# Patient Record
Sex: FEMALE | Race: WHITE | NOT HISPANIC OR LATINO | Employment: FULL TIME | RURAL
[De-identification: names, ages, dates, MRNs, and addresses within clinical notes are randomized per-mention and may not be internally consistent; named-entity substitution may affect disease eponyms.]

---

## 2022-09-20 ENCOUNTER — OUTSIDE PLACE OF SERVICE (OUTPATIENT)
Dept: CARDIOLOGY | Facility: HOSPITAL | Age: 62
End: 2022-09-20
Payer: COMMERCIAL

## 2022-09-20 PROCEDURE — 93010 ELECTROCARDIOGRAM REPORT: CPT | Mod: ,,, | Performed by: INTERNAL MEDICINE

## 2022-09-20 PROCEDURE — 93010 PR ELECTROCARDIOGRAM REPORT: ICD-10-PCS | Mod: ,,, | Performed by: INTERNAL MEDICINE

## 2023-03-01 ENCOUNTER — HOSPITAL ENCOUNTER (EMERGENCY)
Facility: HOSPITAL | Age: 63
Discharge: HOME | End: 2023-03-01
Payer: COMMERCIAL

## 2023-03-01 VITALS
TEMPERATURE: 98 F | HEIGHT: 64 IN | OXYGEN SATURATION: 96 % | RESPIRATION RATE: 20 BRPM | HEART RATE: 90 BPM | BODY MASS INDEX: 39.27 KG/M2 | SYSTOLIC BLOOD PRESSURE: 140 MMHG | DIASTOLIC BLOOD PRESSURE: 70 MMHG | WEIGHT: 230 LBS

## 2023-03-01 DIAGNOSIS — T14.90XA TRAUMA: ICD-10-CM

## 2023-03-01 DIAGNOSIS — S62.327A DISPLACED FRACTURE OF SHAFT OF FIFTH METACARPAL BONE, LEFT HAND, INITIAL ENCOUNTER FOR CLOSED FRACTURE: Primary | ICD-10-CM
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
## 2023-03-01 PROCEDURE — 99284 EMERGENCY DEPT VISIT MOD MDM: CPT | Mod: ,,, | Performed by: FAMILY MEDICINE

## 2023-03-01 PROCEDURE — 99284 PR EMERGENCY DEPT VISIT,LEVEL IV: ICD-10-PCS | Mod: ,,, | Performed by: FAMILY MEDICINE

## 2023-03-01 PROCEDURE — 99284 EMERGENCY DEPT VISIT MOD MDM: CPT | Mod: 25

## 2023-03-01 RX ORDER — ATORVASTATIN CALCIUM 40 MG/1
TABLET, FILM COATED ORAL
COMMUNITY

## 2023-03-01 RX ORDER — ASCORBIC ACID 500 MG
500 TABLET,CHEWABLE ORAL
COMMUNITY

## 2023-03-01 RX ORDER — ASPIRIN 81 MG/1
TABLET ORAL
COMMUNITY

## 2023-03-01 RX ORDER — INSULIN DETEMIR 100 [IU]/ML
INJECTION, SOLUTION SUBCUTANEOUS
COMMUNITY
Start: 2023-01-16

## 2023-03-01 RX ORDER — URSODIOL 300 MG/1
CAPSULE ORAL
COMMUNITY

## 2023-03-01 RX ORDER — NAPROXEN 500 MG/1
500 TABLET ORAL 2 TIMES DAILY WITH MEALS
Qty: 60 TABLET | Refills: 0 | Status: ON HOLD | OUTPATIENT
Start: 2023-03-01 | End: 2023-10-18 | Stop reason: CLARIF

## 2023-03-01 RX ORDER — METFORMIN HYDROCHLORIDE 500 MG/1
TABLET ORAL
COMMUNITY

## 2023-03-01 RX ORDER — PANTOPRAZOLE SODIUM 40 MG/1
TABLET, DELAYED RELEASE ORAL
COMMUNITY

## 2023-03-01 RX ORDER — GLIMEPIRIDE 4 MG/1
4 TABLET ORAL 2 TIMES DAILY
COMMUNITY
Start: 2022-12-07

## 2023-03-01 RX ORDER — HYDROCHLOROTHIAZIDE 25 MG/1
12.5 TABLET ORAL
COMMUNITY

## 2023-03-01 RX ORDER — LISINOPRIL AND HYDROCHLOROTHIAZIDE 20; 25 MG/1; MG/1
TABLET ORAL
COMMUNITY

## 2023-03-01 NOTE — Clinical Note
"Tatiana Youngbloode" Nora was seen and treated in our emergency department on 3/1/2023.  She may return to work on 03/08/2023.       If you have any questions or concerns, please don't hesitate to call.      Jethro Colón, DO"

## 2023-03-01 NOTE — ED PROVIDER NOTES
Encounter Date: 3/1/2023       History     Chief Complaint   Patient presents with    Fall     Patient comes in with fall trauma hit face and head stung for 2nd the patient takes aspirin.  She has what appears to be loss teeth  --in her front maxillofacial region.  She also has a deformity of her hand which looks more like a hematoma to the dorsum of the hand.  Decreased range of motion of her fingers on the left.  With edema to the left 5th finger.      Review of patient's allergies indicates:  No Known Allergies  No past medical history on file.  No past surgical history on file.  No family history on file.     Review of Systems   Constitutional: Negative.  Negative for fever.   HENT: Negative.  Negative for sore throat.    Eyes: Negative.    Respiratory: Negative.  Negative for shortness of breath.    Cardiovascular: Negative.  Negative for chest pain.   Gastrointestinal: Negative.  Negative for nausea.   Endocrine: Negative.    Genitourinary: Negative.  Negative for dysuria.   Musculoskeletal: Negative.  Negative for back pain.   Skin: Negative.  Negative for rash.   Allergic/Immunologic: Negative.    Neurological: Negative.  Negative for weakness.        Fall with hand pain on the left with hematoma decreased range of motion   Hematological: Negative.  Does not bruise/bleed easily.   Psychiatric/Behavioral: Negative.     All other systems reviewed and are negative.    Physical Exam     Initial Vitals [03/01/23 1219]   BP Pulse Resp Temp SpO2   (!) 140/70 90 20 98.2 °F (36.8 °C) 96 %      MAP       --         Physical Exam    Constitutional: She appears well-developed and well-nourished.   HENT:   Head: Normocephalic and atraumatic.   Right Ear: External ear normal.   Left Ear: External ear normal.   Nose: Nose normal.   Mouth/Throat: Oropharynx is clear and moist.   Eyes: Conjunctivae and EOM are normal. Pupils are equal, round, and reactive to light.   Neck: Neck supple.   Normal range of  motion.  Cardiovascular:  Normal rate, regular rhythm, normal heart sounds and intact distal pulses.           Pulmonary/Chest: Breath sounds normal.   Abdominal: Abdomen is soft. Bowel sounds are normal.   Genitourinary:    Vagina and uterus normal.     Musculoskeletal:         General: Normal range of motion.      Cervical back: Normal range of motion and neck supple.      Comments: Patient with left hand pain.  With edema and what looks to be a hematoma to the top of the left hand.  Decreased range of motion of her left 5th finger.     Neurological: She is alert and oriented to person, place, and time. She has normal strength and normal reflexes.   Skin: Skin is warm. Capillary refill takes less than 2 seconds.   Psychiatric: She has a normal mood and affect. Her behavior is normal. Judgment and thought content normal.       Medical Screening Exam   See Full Note    ED Course   Procedures  Labs Reviewed - No data to display       Imaging Results              X-Ray Hand 3 view Left (Final result)  Result time 03/01/23 13:04:29      Final result by Gabriel Osborn DO (03/01/23 13:04:29)                   Impression:      Acute nondisplaced fracture at the neck of the 5th metacarpal.    Additional acute comminuted fracture at the proximal aspect of the proximal phalanx of the 5th ray.      Electronically signed by: Gabriel Osborn  Date:    03/01/2023  Time:    13:04               Narrative:    EXAMINATION:  XR HAND COMPLETE 3 VIEW LEFT    CLINICAL HISTORY:  Patient with fall trauma to the left hand with edema swelling rule out fracture;    TECHNIQUE:  XR HAND COMPLETE 3 VIEW LEFT    COMPARISON:  None    FINDINGS:  Acute nondisplaced fracture at the neck of the 5th metacarpal.    Additional acute comminuted fracture at the proximal aspect of the proximal phalanx of the 5th ray.    No joint abnormality.    No radiopaque foreign bodies.                                       X-Ray Ribs 2 View Left (Final result)   Result time 03/01/23 13:05:20      Final result by Gabriel Osborn DO (03/01/23 13:05:20)                   Impression:      No acute findings      Electronically signed by: Gabriel Osborn  Date:    03/01/2023  Time:    13:05               Narrative:    EXAMINATION:  XR RIBS 2 VIEW LEFT    CLINICAL HISTORY:  Injury, unspecified, initial encounter    TECHNIQUE:  XR RIBS 2 VIEW LEFT    COMPARISON:  None    FINDINGS:  No acute fracture or dislocation.    The visualized left lung is clear.  The pleura is normal.  Heart is enlarged.                                       CT Maxillofacial Without Contrast (Final result)  Result time 03/01/23 13:03:29      Final result by Gabriel Osborn DO (03/01/23 13:03:29)                   Impression:      No acute findings    Place of service: [facility]      Electronically signed by: Gabriel Osborn  Date:    03/01/2023  Time:    13:03               Narrative:    EXAMINATION:  CT MAXILLOFACIAL WITHOUT CONTRAST    CLINICAL HISTORY:  Maxillofacial pain;    TECHNIQUE:  Multiplanar CT of the facial bones without contrast.    COMPARISON:  3/1/23    FINDINGS:  The bony orbits, zygomatic arches, pterygoid plates, maxilla, nasal bones and mandible are intact.    No significant soft tissue swelling is suggested.    No air-fluid levels noted within the paranasal sinuses.                                       CT Head Without Contrast (Final result)  Result time 03/01/23 13:01:51      Final result by Gabriel Osborn DO (03/01/23 13:01:51)                   Impression:      No acute intracranial findings.      Electronically signed by: Gabriel Osborn  Date:    03/01/2023  Time:    13:01               Narrative:    EXAMINATION:  CT HEAD WITHOUT CONTRAST    CLINICAL HISTORY:  Headache, chronic, new features or increased frequency;    TECHNIQUE:  Multiplanar CT imaging from the vertex to skull the skull base was performed without  contrast.    COMPARISON:  None    FINDINGS:  Gray-white white differentiation is normal.    There is no CT evidence of acute intracranial hemorrhage or large territorial infarct. No epidural/subdural hematomas.    There is no evidence of hydrocephalus, midline shift or mass effect.    Scalp, paranasal sinuses, and mastoid air cells are normal.                                       Medications - No data to display  Medical Decision Making:   Initial Assessment:   Patient comes in with pain to the left hand as mentioned before has a hematoma over the left hand possible fracture of the left hand.  Decreased amount of range of motion to the left 5th finger.  And she also has loose front T and at tripped and hit her head and facial bones.  Differential Diagnosis:   Hematoma to the left hand over the dorsal area of the metacarpal bones.                 Clinical Impression:   Final diagnoses:  [W19.XXXA] Fall, initial encounter  [S62.327A] Displaced fracture of shaft of fifth metacarpal bone, left hand, initial encounter for closed fracture (Primary)               Jethro Colón,   03/01/23 2085

## 2023-03-01 NOTE — Clinical Note
"Tatiana Youngbloode" Nora was seen and treated in our emergency department on 3/1/2023.  She may return to work on 03/13/2023.       If you have any questions or concerns, please don't hesitate to call.      Jethro Colón, DO"

## 2023-03-03 ENCOUNTER — HOSPITAL ENCOUNTER (OUTPATIENT)
Dept: RADIOLOGY | Facility: HOSPITAL | Age: 63
Discharge: HOME | End: 2023-03-03
Attending: ORTHOPAEDIC SURGERY
Payer: COMMERCIAL

## 2023-03-03 ENCOUNTER — OFFICE VISIT (OUTPATIENT)
Dept: ORTHOPEDICS | Facility: CLINIC | Age: 63
End: 2023-03-03
Payer: COMMERCIAL

## 2023-03-03 DIAGNOSIS — M79.642 PAIN OF LEFT HAND: Primary | ICD-10-CM

## 2023-03-03 DIAGNOSIS — M79.642 PAIN OF LEFT HAND: ICD-10-CM

## 2023-03-03 PROCEDURE — 26600 PR CLOSED RX METACARPAL FX: ICD-10-PCS | Mod: S$PBB,LT,, | Performed by: ORTHOPAEDIC SURGERY

## 2023-03-03 PROCEDURE — 26720 TREAT FINGER FRACTURE EACH: CPT | Mod: S$PBB,51,LT, | Performed by: ORTHOPAEDIC SURGERY

## 2023-03-03 PROCEDURE — 26720 TREAT FINGER FRACTURE EACH: CPT | Mod: PBBFAC | Performed by: ORTHOPAEDIC SURGERY

## 2023-03-03 PROCEDURE — 73130 X-RAY EXAM OF HAND: CPT | Mod: 26,LT,, | Performed by: ORTHOPAEDIC SURGERY

## 2023-03-03 PROCEDURE — 26600 TREAT METACARPAL FRACTURE: CPT | Mod: PBBFAC,LT | Performed by: ORTHOPAEDIC SURGERY

## 2023-03-03 PROCEDURE — 73130 X-RAY EXAM OF HAND: CPT | Mod: TC,LT

## 2023-03-03 PROCEDURE — 99213 OFFICE O/P EST LOW 20 MIN: CPT | Mod: PBBFAC,25 | Performed by: ORTHOPAEDIC SURGERY

## 2023-03-03 PROCEDURE — 73130 XR HAND COMPLETE 3 VIEW LEFT: ICD-10-PCS | Mod: 26,LT,, | Performed by: ORTHOPAEDIC SURGERY

## 2023-03-03 PROCEDURE — 1159F PR MEDICATION LIST DOCUMENTED IN MEDICAL RECORD: ICD-10-PCS | Mod: ,,, | Performed by: ORTHOPAEDIC SURGERY

## 2023-03-03 PROCEDURE — 1159F MED LIST DOCD IN RCRD: CPT | Mod: ,,, | Performed by: ORTHOPAEDIC SURGERY

## 2023-03-03 PROCEDURE — 99202 OFFICE O/P NEW SF 15 MIN: CPT | Mod: S$PBB,57,, | Performed by: ORTHOPAEDIC SURGERY

## 2023-03-03 PROCEDURE — 26720 PR CLOSE RX PROX/MID FING SHFT FX: ICD-10-PCS | Mod: S$PBB,51,LT, | Performed by: ORTHOPAEDIC SURGERY

## 2023-03-03 PROCEDURE — 26600 TREAT METACARPAL FRACTURE: CPT | Mod: S$PBB,LT,, | Performed by: ORTHOPAEDIC SURGERY

## 2023-03-03 PROCEDURE — 99202 PR OFFICE/OUTPT VISIT, NEW, LEVL II, 15-29 MIN: ICD-10-PCS | Mod: S$PBB,57,, | Performed by: ORTHOPAEDIC SURGERY

## 2023-03-03 NOTE — PROGRESS NOTES
CC:   Chief Complaint   Patient presents with    Left Hand - Injury        PREVIOUS INFO:        HISTORY:   3/3/2023    Tatiana Melendez  is a 62 y.o. fell on concrete injured her left hand on Wednesday had x-rays performed which show fractures referred in      PAST MEDICAL HISTORY: History reviewed. No pertinent past medical history.       PAST SURGICAL HISTORY:   Past Surgical History:   Procedure Laterality Date    ANKLE FRACTURE SURGERY      KNEE ARTHROSCOPY            ALLERGIES: Review of patient's allergies indicates:  No Known Allergies     MEDICATIONS :    Current Outpatient Medications:     ascorbic acid, vitamin C, 500 mg Chew, Take 500 mg by mouth., Disp: , Rfl:     aspirin (ASPIR-LOW) 81 MG EC tablet, Aspir-Low 81 mg tablet,delayed release  Take 1 tablet every day by oral route., Disp: , Rfl:     atorvastatin (LIPITOR) 40 MG tablet, atorvastatin 40 mg tablet  TAKE 1/2 (ONE-HALF) TABLET BY MOUTH ONCE DAILY AT BEDTIME, Disp: , Rfl:     empagliflozin (JARDIANCE) 25 mg tablet, Jardiance 25 mg tablet, Disp: , Rfl:     glimepiride (AMARYL) 4 MG tablet, Take 4 mg by mouth 2 (two) times daily., Disp: , Rfl:     hydroCHLOROthiazide (HYDRODIURIL) 25 MG tablet, Take 12.5 mg by mouth., Disp: , Rfl:     LEVEMIR FLEXTOUCH U-100 INSULN 100 unit/mL (3 mL) InPn pen, SMARTSI Unit(s) SUB-Q Twice Daily, Disp: , Rfl:     lisinopriL-hydrochlorothiazide (PRINZIDE,ZESTORETIC) 20-25 mg Tab, lisinopril 20 mg-hydrochlorothiazide 25 mg tablet  TAKE 1/2 (ONE-HALF) TABLET BY MOUTH ONCE DAILY, Disp: , Rfl:     metFORMIN (GLUCOPHAGE) 500 MG tablet, metformin 500 mg tablet  TAKE 2 TABLETS BY MOUTH TWICE DAILY, Disp: , Rfl:     naproxen (NAPROSYN) 500 MG tablet, Take 1 tablet (500 mg total) by mouth 2 (two) times daily with meals., Disp: 60 tablet, Rfl: 0    pantoprazole (PROTONIX) 40 MG tablet, pantoprazole 40 mg tablet,delayed release  TAKE ONE TABLET BY MOUTH AT 7am AND 7pm, Disp: , Rfl:     ursodioL (ACTIGALL) 300 mg  capsule, ursodiol 300 mg capsule  TAKE 1 CAPSULE BY MOUTH TWICE DAILY, Disp: , Rfl:      SOCIAL HISTORY:   Social History     Socioeconomic History    Marital status: Single        ROS    FAMILY HISTORY: History reviewed. No pertinent family history.       PHYSICAL EXAM: There were no vitals filed for this visit.            There is no height or weight on file to calculate BMI.     In general, this is a well-developed, well-nourished female . The patient is alert, oriented and cooperative.      HEENT:  Normocephalic, atraumatic.  Extraocular movements are intact bilaterally.  The oropharynx is benign.       NECK:  Nontender with good range of motion.      PULMONARY: Respirations are even and non-labored.       CARDIOVASCULAR: Pulses regular by peripheral palpation.       ABDOMEN:  Soft, non-tender, non-distended.        EXTREMITIES:  Left hand is bruised fairly diffusely but it soft she is tender on the lateral side more    Ortho Exam      RADIOGRAPHIC FINDINGS:  X-rays reviewed from March 1st showing a 5th metacarpal neck fracture nondisplaced and a fracture involving the proximal phalanx little finger and overall good alignment    Right hand AP lateral oblique views splint ulnar gutter splint in place fractures involving the proximal phalanx of the little finger in good alignment AP lateral oblique views fracture of the 5th metacarpal seen on the previous films is poorly visualized with excellent alignment of the 5th metacarpal  .      IMPRESSION:  Right-handed patient with a left 5th metacarpal neck fracture   and a little finger proximal phalanx fracture    PLAN:  Ulnar gutter splint applied today follow-up x-rays obtained splint was made out of plaster        No follow-ups on file.         Richi Bello III      (Subject to voice recognition error, transcription service not allowed)

## 2023-03-27 DIAGNOSIS — M79.642 PAIN OF LEFT HAND: Primary | ICD-10-CM

## 2023-03-28 ENCOUNTER — HOSPITAL ENCOUNTER (OUTPATIENT)
Dept: RADIOLOGY | Facility: HOSPITAL | Age: 63
Discharge: HOME | End: 2023-03-28
Attending: ORTHOPAEDIC SURGERY
Payer: OTHER MISCELLANEOUS

## 2023-03-28 ENCOUNTER — OFFICE VISIT (OUTPATIENT)
Dept: ORTHOPEDICS | Facility: CLINIC | Age: 63
End: 2023-03-28
Payer: OTHER MISCELLANEOUS

## 2023-03-28 DIAGNOSIS — Z78.0 MENOPAUSE: ICD-10-CM

## 2023-03-28 DIAGNOSIS — Z09 FOLLOW-UP EXAMINATION, FOLLOWING OTHER SURGERY: Primary | ICD-10-CM

## 2023-03-28 DIAGNOSIS — M79.642 PAIN OF LEFT HAND: ICD-10-CM

## 2023-03-28 PROCEDURE — 99024 POSTOP FOLLOW-UP VISIT: CPT | Mod: ,,, | Performed by: ORTHOPAEDIC SURGERY

## 2023-03-28 PROCEDURE — 73130 XR HAND COMPLETE 3 VIEW LEFT: ICD-10-PCS | Mod: 26,LT,, | Performed by: ORTHOPAEDIC SURGERY

## 2023-03-28 PROCEDURE — 73130 X-RAY EXAM OF HAND: CPT | Mod: TC,LT

## 2023-03-28 PROCEDURE — 29125 APPL SHORT ARM SPLINT STATIC: CPT | Mod: PBBFAC | Performed by: ORTHOPAEDIC SURGERY

## 2023-03-28 PROCEDURE — 99024 PR POST-OP FOLLOW-UP VISIT: ICD-10-PCS | Mod: ,,, | Performed by: ORTHOPAEDIC SURGERY

## 2023-03-28 PROCEDURE — 73130 X-RAY EXAM OF HAND: CPT | Mod: 26,LT,, | Performed by: ORTHOPAEDIC SURGERY

## 2023-03-28 PROCEDURE — 29125 APPL SHORT ARM SPLINT STATIC: CPT | Mod: S$PBB,58,LT, | Performed by: ORTHOPAEDIC SURGERY

## 2023-03-28 PROCEDURE — 99212 OFFICE O/P EST SF 10 MIN: CPT | Mod: PBBFAC | Performed by: ORTHOPAEDIC SURGERY

## 2023-03-28 PROCEDURE — 29125 PR APPLY FOREARM SPLINT,STATIC: ICD-10-PCS | Mod: S$PBB,58,LT, | Performed by: ORTHOPAEDIC SURGERY

## 2023-03-28 NOTE — PROGRESS NOTES
CC:   Chief Complaint   Patient presents with    Follow-up     LT HAND 5TH MC NECK FX & LT LITTLE FINGER PROX PHALANX 3/1 (4 WEEKS)        PREVIOUS INFO:  HISTORY:   3/3/2023    Tatiana Melendez  is a 62 y.o. fell on concrete injured her left hand on Wednesday had x-rays performed which show fractures referred in      HISTORY:   3/28/2023    Tatiana Melendez  is a 62 y.o. ulnar gutter splint removed today following up a right 5th metacarpal neck fracture and a little finger proximal phalanx fracture.      PAST MEDICAL HISTORY: No past medical history on file.       PAST SURGICAL HISTORY:   Past Surgical History:   Procedure Laterality Date    ANKLE FRACTURE SURGERY      KNEE ARTHROSCOPY            ALLERGIES: Review of patient's allergies indicates:  No Known Allergies     MEDICATIONS :    Current Outpatient Medications:     ascorbic acid, vitamin C, 500 mg Chew, Take 500 mg by mouth., Disp: , Rfl:     aspirin (ASPIR-LOW) 81 MG EC tablet, Aspir-Low 81 mg tablet,delayed release  Take 1 tablet every day by oral route., Disp: , Rfl:     atorvastatin (LIPITOR) 40 MG tablet, atorvastatin 40 mg tablet  TAKE 1/2 (ONE-HALF) TABLET BY MOUTH ONCE DAILY AT BEDTIME, Disp: , Rfl:     empagliflozin (JARDIANCE) 25 mg tablet, Jardiance 25 mg tablet, Disp: , Rfl:     glimepiride (AMARYL) 4 MG tablet, Take 4 mg by mouth 2 (two) times daily., Disp: , Rfl:     hydroCHLOROthiazide (HYDRODIURIL) 25 MG tablet, Take 12.5 mg by mouth., Disp: , Rfl:     LEVEMIR FLEXTOUCH U-100 INSULN 100 unit/mL (3 mL) InPn pen, SMARTSI Unit(s) SUB-Q Twice Daily, Disp: , Rfl:     lisinopriL-hydrochlorothiazide (PRINZIDE,ZESTORETIC) 20-25 mg Tab, lisinopril 20 mg-hydrochlorothiazide 25 mg tablet  TAKE 1/2 (ONE-HALF) TABLET BY MOUTH ONCE DAILY, Disp: , Rfl:     metFORMIN (GLUCOPHAGE) 500 MG tablet, metformin 500 mg tablet  TAKE 2 TABLETS BY MOUTH TWICE DAILY, Disp: , Rfl:     naproxen (NAPROSYN) 500 MG tablet, Take 1 tablet (500 mg total) by mouth 2  (two) times daily with meals., Disp: 60 tablet, Rfl: 0    pantoprazole (PROTONIX) 40 MG tablet, pantoprazole 40 mg tablet,delayed release  TAKE ONE TABLET BY MOUTH AT 7am AND 7pm, Disp: , Rfl:     ursodioL (ACTIGALL) 300 mg capsule, ursodiol 300 mg capsule  TAKE 1 CAPSULE BY MOUTH TWICE DAILY, Disp: , Rfl:      SOCIAL HISTORY:   Social History     Socioeconomic History    Marital status: Single        ROS    FAMILY HISTORY: No family history on file.       PHYSICAL EXAM: There were no vitals filed for this visit.            There is no height or weight on file to calculate BMI.     In general, this is a well-developed, well-nourished female . The patient is alert, oriented and cooperative.      HEENT:  Normocephalic, atraumatic.  Extraocular movements are intact bilaterally.  The oropharynx is benign.       NECK:  Nontender with good range of motion.      PULMONARY: Respirations are even and non-labored.       CARDIOVASCULAR: Pulses regular by peripheral palpation.       ABDOMEN:  Soft, non-tender, non-distended.        EXTREMITIES:  On exam her hands since looks looks normal exam as far as alignment there is some bruising over the ring finger in addition over the proximal phalanx swelling is gone    Ortho Exam      RADIOGRAPHIC FINDINGS:  Left hand three views AP lateral oblique view splints in place pre visualized fractures of the 5th metacarpal neck and the proximal phalanx of the little finger in good alignment no definitive fracture seen involving the ring finger but there is significant osteoporosis diffusely      .      IMPRESSION:  I will hold her still 2 more weeks and ulnar gutter splint and then see her back and get x-rays    PLAN:  I the splint start motion at that point new ulnar gutter plaster splint left upper extremity        No follow-ups on file.         Ricih Bello III      (Subject to voice recognition error, transcription service not allowed)

## 2023-04-13 DIAGNOSIS — M79.642 PAIN OF LEFT HAND: Primary | ICD-10-CM

## 2023-04-14 ENCOUNTER — OFFICE VISIT (OUTPATIENT)
Dept: ORTHOPEDICS | Facility: CLINIC | Age: 63
End: 2023-04-14
Payer: OTHER MISCELLANEOUS

## 2023-04-14 ENCOUNTER — HOSPITAL ENCOUNTER (OUTPATIENT)
Dept: RADIOLOGY | Facility: HOSPITAL | Age: 63
Discharge: HOME | End: 2023-04-14
Attending: ORTHOPAEDIC SURGERY
Payer: OTHER MISCELLANEOUS

## 2023-04-14 DIAGNOSIS — M79.642 PAIN OF LEFT HAND: ICD-10-CM

## 2023-04-14 DIAGNOSIS — Z09 FOLLOW-UP EXAMINATION, FOLLOWING OTHER SURGERY: Primary | ICD-10-CM

## 2023-04-14 PROCEDURE — 73130 X-RAY EXAM OF HAND: CPT | Mod: TC,LT

## 2023-04-14 PROCEDURE — 73130 X-RAY EXAM OF HAND: CPT | Mod: 26,LT,, | Performed by: ORTHOPAEDIC SURGERY

## 2023-04-14 PROCEDURE — 99024 POSTOP FOLLOW-UP VISIT: CPT | Mod: ,,, | Performed by: ORTHOPAEDIC SURGERY

## 2023-04-14 PROCEDURE — 73130 XR HAND COMPLETE 3 VIEW LEFT: ICD-10-PCS | Mod: 26,LT,, | Performed by: ORTHOPAEDIC SURGERY

## 2023-04-14 PROCEDURE — 99024 PR POST-OP FOLLOW-UP VISIT: ICD-10-PCS | Mod: ,,, | Performed by: ORTHOPAEDIC SURGERY

## 2023-04-14 PROCEDURE — 99212 OFFICE O/P EST SF 10 MIN: CPT | Mod: PBBFAC | Performed by: ORTHOPAEDIC SURGERY

## 2023-04-14 NOTE — PROGRESS NOTES
CC:    Chief Complaint   Patient presents with    Follow-up     LT HAND 5TH MC NECK FX & LT LITTLE FINGER PROX PHALANX 3/1 (6 WEEKS)           Previos History :  Follow-up        LT HAND 5TH MC NECK FX & LT LITTLE FINGER PROX PHALANX 3/1 (4 WEEKS)         PREVIOUS INFO:  HISTORY:   3/3/2023    Tatiana Melendez  is a 62 y.o. fell on concrete injured her left hand on Wednesday had x-rays performed which show fractures referred in        HISTORY:   3/28/2023    Tatiana Melendez  is a 62 y.o. ulnar gutter splint removed today following up  left  5th metacarpal neck fracture and a little finger proximal phalanx fracture.         History:  4/14/2023   Tatiana Melendez is a 62 y.o.  status post follow-up fractures 5th metacarpal neck and proximal phalanx little finger left hand date of injury 03/01/2023 proximally 6 weeks ago        PE:   Fingers are stiff as would be expected overall good alignment      Radiology:  Left hand AP lateral oblique views osteopenia diffusely progressively healing fractures involving the 5th metacarpal neck and proximal phalanx of the little finger progressively healing good        Ass/Plan:  Gonzales-tape work on motion no strenuous activities I did write a prescription for therapy will check on about 4 weeks x-rays left hand        Richi Bello III, MD    Subject to voice recognition errors,  transcription services are not allowed

## 2023-05-19 DIAGNOSIS — M79.642 PAIN OF LEFT HAND: Primary | ICD-10-CM

## 2023-05-30 ENCOUNTER — OFFICE VISIT (OUTPATIENT)
Dept: ORTHOPEDICS | Facility: CLINIC | Age: 63
End: 2023-05-30
Payer: COMMERCIAL

## 2023-05-30 ENCOUNTER — HOSPITAL ENCOUNTER (OUTPATIENT)
Dept: RADIOLOGY | Facility: HOSPITAL | Age: 63
Discharge: HOME | End: 2023-05-30
Attending: ORTHOPAEDIC SURGERY
Payer: COMMERCIAL

## 2023-05-30 DIAGNOSIS — M79.642 PAIN OF LEFT HAND: Primary | ICD-10-CM

## 2023-05-30 DIAGNOSIS — M79.642 PAIN OF LEFT HAND: ICD-10-CM

## 2023-05-30 DIAGNOSIS — Z09 FOLLOW-UP EXAMINATION, FOLLOWING OTHER SURGERY: Primary | ICD-10-CM

## 2023-05-30 PROCEDURE — 4010F PR ACE/ARB THEARPY RXD/TAKEN: ICD-10-PCS | Mod: ,,, | Performed by: ORTHOPAEDIC SURGERY

## 2023-05-30 PROCEDURE — 73130 XR HAND COMPLETE 3 VIEW LEFT: ICD-10-PCS | Mod: 26,LT,, | Performed by: ORTHOPAEDIC SURGERY

## 2023-05-30 PROCEDURE — 4010F ACE/ARB THERAPY RXD/TAKEN: CPT | Mod: ,,, | Performed by: ORTHOPAEDIC SURGERY

## 2023-05-30 PROCEDURE — 99024 POSTOP FOLLOW-UP VISIT: CPT | Mod: ,,, | Performed by: ORTHOPAEDIC SURGERY

## 2023-05-30 PROCEDURE — 99212 OFFICE O/P EST SF 10 MIN: CPT | Mod: PBBFAC | Performed by: ORTHOPAEDIC SURGERY

## 2023-05-30 PROCEDURE — 99024 PR POST-OP FOLLOW-UP VISIT: ICD-10-PCS | Mod: ,,, | Performed by: ORTHOPAEDIC SURGERY

## 2023-05-30 PROCEDURE — 73130 X-RAY EXAM OF HAND: CPT | Mod: 26,LT,, | Performed by: ORTHOPAEDIC SURGERY

## 2023-05-30 PROCEDURE — 73130 X-RAY EXAM OF HAND: CPT | Mod: TC,LT

## 2023-05-30 NOTE — PROGRESS NOTES
CC:    Chief Complaint   Patient presents with    Follow-up     LT HAND 5TH MC NECK FX & LT LITTLE FINGER PROXPHALANX 3/1 (3MTHS)           Previos History :        History:  5/30/2023   Tatiana Melendez is a 62 y.o.  status post 3 months out left hand fracture of the 5th metacarpal shaft and the proximal phalanx of the left little finger surgery was the fracture was on 03/01/2023 3 months        PE:   Comes in she is about 3 months out left hand little finger and 5th metacarpal neck fracture  MCP joint 0-50 degrees of motion  PIP joint 5-90 degrees      Radiology:  Left hand three views AP lateral oblique view progressively healing fracture involving the proximal phalanx of the little finger and metacarpal fracture varus significantly osteopenic bone        Ass/Plan:  Continue push the motion she never made to formal therapy she is 3 months out her continue to work on will do an impairment consider today date of maximum medical improvement    Impairment rating  Based on loss of motion MCP joint motion is 0-50 degrees which would be a 22% impairment of the finger  PIP joint motion is 5-90 which would be a 9% impairment to the finger  Total impairment of the little finger is 31%  3% to the hand  2% to the upper extremity  1% to the person        Richi Bello III, MD    Subject to voice recognition errors,  transcription services are not allowed

## 2023-06-12 ENCOUNTER — HOSPITAL ENCOUNTER (OUTPATIENT)
Dept: RADIOLOGY | Facility: HOSPITAL | Age: 63
Discharge: HOME | End: 2023-06-12
Attending: ORTHOPAEDIC SURGERY
Payer: COMMERCIAL

## 2023-06-12 DIAGNOSIS — Z78.0 MENOPAUSE: ICD-10-CM

## 2023-06-12 PROCEDURE — 77080 DXA BONE DENSITY AXIAL: CPT | Mod: 26,,, | Performed by: RADIOLOGY

## 2023-06-12 PROCEDURE — 77080 DXA BONE DENSITY AXIAL SKELETON 1 OR MORE SITES: ICD-10-PCS | Mod: 26,,, | Performed by: RADIOLOGY

## 2023-06-12 PROCEDURE — 77080 DXA BONE DENSITY AXIAL: CPT | Mod: TC

## 2023-06-14 ENCOUNTER — TELEPHONE (OUTPATIENT)
Dept: ORTHOPEDICS | Facility: CLINIC | Age: 63
End: 2023-06-14
Payer: COMMERCIAL

## 2023-06-14 DIAGNOSIS — Z78.0 MENOPAUSE: Primary | ICD-10-CM

## 2023-06-14 NOTE — TELEPHONE ENCOUNTER
----- Message from Richi Bello III, MD sent at 6/13/2023  1:20 PM CDT -----  Referral to Jemima Hernandez  Osteopenia on bone density study

## 2023-09-13 DIAGNOSIS — M25.562 ACUTE PAIN OF LEFT KNEE: Primary | ICD-10-CM

## 2023-09-14 ENCOUNTER — OFFICE VISIT (OUTPATIENT)
Dept: ORTHOPEDICS | Facility: CLINIC | Age: 63
End: 2023-09-14
Payer: OTHER MISCELLANEOUS

## 2023-09-14 ENCOUNTER — HOSPITAL ENCOUNTER (OUTPATIENT)
Dept: RADIOLOGY | Facility: HOSPITAL | Age: 63
Discharge: HOME | End: 2023-09-14
Attending: NURSE PRACTITIONER
Payer: OTHER MISCELLANEOUS

## 2023-09-14 VITALS
RESPIRATION RATE: 16 BRPM | HEART RATE: 76 BPM | HEIGHT: 64 IN | TEMPERATURE: 98 F | BODY MASS INDEX: 39.27 KG/M2 | OXYGEN SATURATION: 99 % | DIASTOLIC BLOOD PRESSURE: 82 MMHG | WEIGHT: 230 LBS | SYSTOLIC BLOOD PRESSURE: 122 MMHG

## 2023-09-14 DIAGNOSIS — M25.562 ACUTE PAIN OF LEFT KNEE: ICD-10-CM

## 2023-09-14 DIAGNOSIS — M23.92 INTERNAL DERANGEMENT OF LEFT KNEE: Primary | ICD-10-CM

## 2023-09-14 PROCEDURE — 99214 PR OFFICE/OUTPT VISIT, EST, LEVL IV, 30-39 MIN: ICD-10-PCS | Mod: S$PBB,,, | Performed by: NURSE PRACTITIONER

## 2023-09-14 PROCEDURE — 73562 X-RAY EXAM OF KNEE 3: CPT | Mod: 26,LT,, | Performed by: RADIOLOGY

## 2023-09-14 PROCEDURE — 73562 X-RAY EXAM OF KNEE 3: CPT | Mod: TC,LT

## 2023-09-14 PROCEDURE — 99214 OFFICE O/P EST MOD 30 MIN: CPT | Mod: S$PBB,,, | Performed by: NURSE PRACTITIONER

## 2023-09-14 PROCEDURE — 73562 XR KNEE AP LAT WITH SUNRISE LEFT: ICD-10-PCS | Mod: 26,LT,, | Performed by: RADIOLOGY

## 2023-09-14 PROCEDURE — 99215 OFFICE O/P EST HI 40 MIN: CPT | Mod: PBBFAC | Performed by: NURSE PRACTITIONER

## 2023-09-14 NOTE — PATIENT INSTRUCTIONS
Safety guidelines and activity restrictions are discussed with the patient.  She verbalized understanding.  May weightbear as tolerated with crutches.  May use knee immobilizer she finds it comfortable.  Continue use of naproxen per label directions.  May add acetaminophen over-the-counter per label directions for comfort.  We will given order for MRI examination left knee.  She wishes to take it to [facility] in [facility].  She will get a copy of the disc and report once it is completed.  She will call back for scheduling with Dr. Bello at that time.

## 2023-09-14 NOTE — PROGRESS NOTES
62-year-old female presents ambulatory to orthopedic clinic with a knee immobilizer on left lower extremity.  Reports pain on the lateral side of her knee.  Some pain on the anterior portion of her knee.  Exquisite tenderness on the medial side of her knee.  Also on crutches.  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  States she twisted her knee.  States that she presented to his [facility] on 08/29/2023 where x-rays were obtained of the left knee she was told that they felt she would sprained her knee.  They also recommended she be seen for consideration of MRI examination of her knee.  They issued her knee immobilizer and crutches.  She was taken naproxen sodium but has not found it to be beneficial.  She is employed as a teacher.      X-ray:  X-rays today left knee AP, lateral and sunrise view shows mild DJD changes tricompartmental distribution.  No evidence acute fracture, dislocation or pathologic bone.    PE:  She is general she is awake, alert oriented appropriate.  No acute distress noted.  Respirations unlabored.  Skin is warm dry and intact.  No acute distress noted.  Physical exam of her left knee skin is warm dry and intact.  No soft tissue swelling is noted.  No intra-articular effusion appreciated clinically.  Range of motion left knee 0° extension with further flexion to approximately 120°.  There is excellent ligamentous balance instability noted clinically.  There is no tenderness to palpation quad tendon there is no tenderness palpation patella tendon.  There is some tenderness palpation of the lateral joint line.  There is exquisite tenderness palpation medial joint line.  Anterior drawer test is negative.  Posterior drawer test is negative.      Impression:  Suspect internal derangement knee-left     Plan:  Safety guidelines and activity restrictions are discussed with the patient.  She  verbalized understanding.  May weightbear as tolerated with crutches.  May use knee immobilizer she finds it comfortable.  Continue use of naproxen per label directions.  May add acetaminophen over-the-counter per label directions for comfort.  We will given order for MRI examination left knee.  She wishes to take it to [facility] in [facility].  She will get a copy of the disc and report once it is completed.  She will call back for scheduling with Dr. Bello at that time.

## 2023-09-21 ENCOUNTER — TELEPHONE (OUTPATIENT)
Dept: ORTHOPEDICS | Facility: CLINIC | Age: 63
End: 2023-09-21
Payer: COMMERCIAL

## 2023-09-21 NOTE — TELEPHONE ENCOUNTER
----- Message from Rona Bartholomew sent at 9/21/2023  8:42 AM CDT -----  # PATIENT IS WONDERING WHEN ARE YOU GOING TO SCHEDULE FOR MRI CALL BACK NUMBER [phone]

## 2023-09-25 ENCOUNTER — TELEPHONE (OUTPATIENT)
Dept: ORTHOPEDICS | Facility: CLINIC | Age: 63
End: 2023-09-25
Payer: COMMERCIAL

## 2023-09-25 NOTE — TELEPHONE ENCOUNTER
----- Message from Savannah De La Rosa sent at 9/25/2023  8:40 AM CDT -----  Pt calling checking on status of mri - pt states she has left multiple messages over a week now and has not received a call back - pt is wanting to speak with someone today and know what is taking staff so long to get mri scheduled - call back # [phone]

## 2023-10-10 ENCOUNTER — OFFICE VISIT (OUTPATIENT)
Dept: ORTHOPEDICS | Facility: CLINIC | Age: 63
End: 2023-10-10
Payer: OTHER MISCELLANEOUS

## 2023-10-10 ENCOUNTER — CLINICAL SUPPORT (OUTPATIENT)
Dept: CARDIOLOGY | Facility: CLINIC | Age: 63
End: 2023-10-10
Payer: COMMERCIAL

## 2023-10-10 ENCOUNTER — HOSPITAL ENCOUNTER (OUTPATIENT)
Dept: RADIOLOGY | Facility: HOSPITAL | Age: 63
Discharge: HOME | End: 2023-10-10
Attending: ORTHOPAEDIC SURGERY
Payer: COMMERCIAL

## 2023-10-10 DIAGNOSIS — Z01.811 PRE-OPERATIVE RESPIRATORY EXAMINATION: ICD-10-CM

## 2023-10-10 DIAGNOSIS — Z01.812 PRE-OPERATIVE LABORATORY EXAMINATION: ICD-10-CM

## 2023-10-10 DIAGNOSIS — Z78.0 MENOPAUSE: Primary | ICD-10-CM

## 2023-10-10 DIAGNOSIS — Z01.810 PRE-OPERATIVE CARDIOVASCULAR EXAMINATION: ICD-10-CM

## 2023-10-10 DIAGNOSIS — S82.142A CLOSED FRACTURE OF LEFT TIBIAL PLATEAU, INITIAL ENCOUNTER: Primary | ICD-10-CM

## 2023-10-10 PROCEDURE — 99211 OFF/OP EST MAY X REQ PHY/QHP: CPT | Mod: PBBFAC,25

## 2023-10-10 PROCEDURE — 99214 OFFICE O/P EST MOD 30 MIN: CPT | Mod: S$PBB,,, | Performed by: ORTHOPAEDIC SURGERY

## 2023-10-10 PROCEDURE — 71046 XR CHEST PA AND LATERAL: ICD-10-PCS | Mod: 26,,, | Performed by: RADIOLOGY

## 2023-10-10 PROCEDURE — 71046 X-RAY EXAM CHEST 2 VIEWS: CPT | Mod: 26,,, | Performed by: RADIOLOGY

## 2023-10-10 PROCEDURE — 93000 ELECTROCARDIOGRAM COMPLETE: CPT | Mod: S$GLB,,, | Performed by: STUDENT IN AN ORGANIZED HEALTH CARE EDUCATION/TRAINING PROGRAM

## 2023-10-10 PROCEDURE — 99213 OFFICE O/P EST LOW 20 MIN: CPT | Mod: PBBFAC,25 | Performed by: ORTHOPAEDIC SURGERY

## 2023-10-10 PROCEDURE — 99214 PR OFFICE/OUTPT VISIT, EST, LEVL IV, 30-39 MIN: ICD-10-PCS | Mod: S$PBB,,, | Performed by: ORTHOPAEDIC SURGERY

## 2023-10-10 PROCEDURE — 93000 EKG 12-LEAD: ICD-10-PCS | Mod: S$GLB,,, | Performed by: STUDENT IN AN ORGANIZED HEALTH CARE EDUCATION/TRAINING PROGRAM

## 2023-10-10 PROCEDURE — 71046 X-RAY EXAM CHEST 2 VIEWS: CPT | Mod: TC

## 2023-10-10 NOTE — LETTER
October 10, 2023      [facility]  [address] [place] [id number]  Phone: [phone]  Fax: [phone]       Patient: Tatiana Melendez   YOB: 1960  Date of Visit: 10/10/2023    To Whom It May Concern:    Stephen Melendez  was at [facility] on 10/10/2023. The patient may return to work/school on 10/11/23 seated job only. If you have any questions or concerns, or if I can be of further assistance, please do not hesitate to contact me.    Sincerely,    Lotus Bello III, M.D.

## 2023-10-10 NOTE — PATIENT INSTRUCTIONS
Surgery Instructions     Your surgery is scheduled for 10/18/23 at [facility] on the ground floor of the Ambulatory building. You should arrive at 10:30 at the Ambulatory Care Center located at [address].    Pre Op Testing: TODAY     ____ Lab  (1st floor clinic)   ____ EKG  (2nd floor clinic)  ____ Chest xray (Imaging Center)       Our office will contact you the day before surgery with your arrival time.  DO NOT eat or drink anything after midnight the night before surgery (this includes gum, candy, chewing tobacco, etc).  You CAN NOT drive after surgery, please arrange for someone to drive you.  Bring all medication in their original bottles.  Bathe with Hibiclens the night or morning before your surgery.  The morning of your surgery ONLY take blood pressure, heart, acid reflux, or thyroid (if you take a morning dose) medication with a sip of water.   Be sure to have stopped your blood thinner medication at the appropriate time, as instructed.  Bring your C-Pap machine if you have one.  All jewelry, piercings, or false eyelashes MUST be removed prior to surgery.

## 2023-10-10 NOTE — PROGRESS NOTES
CC:   Chief Complaint   Patient presents with    Left Knee - Pain        PREVIOUS INFO:  Pasquale Calix FNP   Nurse Practitioner  Specialty:  Emergency Medicine  Progress Notes      Signed  Encounter Date:  9/14/2023  Creation Time:  9/14/2023  9:08 AM                 62-year-old female presents ambulatory to orthopedic clinic with a knee immobilizer on left lower extremity.  Reports pain on the lateral side of her knee.  Some pain on the anterior portion of her knee.  Exquisite tenderness on the medial side of her knee.  Also on crutches.  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  States she twisted her knee.  States that she presented to his [facility] on 08/29/2023 where x-rays were obtained of the left knee she was told that they felt she would sprained her knee.  They also recommended she be seen for consideration of MRI examination of her knee.  They issued her knee immobilizer and crutches.  She was taken naproxen sodium but has not found it to be beneficial.  She is employed as a teacher.              HISTORY:   10/10/2023    Tatiana Melendez  is a 62 y.o. worker's comp  left knee injury felt a pop and had pain left knee medially on around August 29, 2023      PAST MEDICAL HISTORY: No past medical history on file.       PAST SURGICAL HISTORY:   Past Surgical History:   Procedure Laterality Date    ANKLE FRACTURE SURGERY      KNEE ARTHROSCOPY            ALLERGIES: Review of patient's allergies indicates:  No Known Allergies     MEDICATIONS :    Current Outpatient Medications:     ascorbic acid, vitamin C, 500 mg Chew, Take 500 mg by mouth., Disp: , Rfl:     aspirin (ASPIR-LOW) 81 MG EC tablet, Aspir-Low 81 mg tablet,delayed release  Take 1 tablet every day by oral route., Disp: , Rfl:     atorvastatin (LIPITOR) 40 MG tablet, atorvastatin 40 mg tablet  TAKE 1/2 (ONE-HALF) TABLET BY MOUTH ONCE  DAILY AT BEDTIME, Disp: , Rfl:     empagliflozin (JARDIANCE) 25 mg tablet, Jardiance 25 mg tablet, Disp: , Rfl:     glimepiride (AMARYL) 4 MG tablet, Take 4 mg by mouth 2 (two) times daily., Disp: , Rfl:     hydroCHLOROthiazide (HYDRODIURIL) 25 MG tablet, Take 12.5 mg by mouth., Disp: , Rfl:     LEVEMIR FLEXTOUCH U-100 INSULN 100 unit/mL (3 mL) InPn pen, SMARTSI Unit(s) SUB-Q Twice Daily, Disp: , Rfl:     lisinopriL-hydrochlorothiazide (PRINZIDE,ZESTORETIC) 20-25 mg Tab, lisinopril 20 mg-hydrochlorothiazide 25 mg tablet  TAKE 1/2 (ONE-HALF) TABLET BY MOUTH ONCE DAILY, Disp: , Rfl:     metFORMIN (GLUCOPHAGE) 500 MG tablet, metformin 500 mg tablet  TAKE 2 TABLETS BY MOUTH TWICE DAILY, Disp: , Rfl:     naproxen (NAPROSYN) 500 MG tablet, Take 1 tablet (500 mg total) by mouth 2 (two) times daily with meals., Disp: 60 tablet, Rfl: 0    pantoprazole (PROTONIX) 40 MG tablet, pantoprazole 40 mg tablet,delayed release  TAKE ONE TABLET BY MOUTH AT 7am AND 7pm, Disp: , Rfl:     ursodioL (ACTIGALL) 300 mg capsule, ursodiol 300 mg capsule  TAKE 1 CAPSULE BY MOUTH TWICE DAILY, Disp: , Rfl:      SOCIAL HISTORY:   Social History     Socioeconomic History    Marital status: Single   Tobacco Use    Smoking status: Never    Smokeless tobacco: Never        ROS    FAMILY HISTORY: No family history on file.       PHYSICAL EXAM: There were no vitals filed for this visit.            There is no height or weight on file to calculate BMI.     In general, this is a well-developed, well-nourished female . The patient is alert, oriented and cooperative.      HEENT:  Normocephalic, atraumatic.  Extraocular movements are intact bilaterally.  The oropharynx is benign.       NECK:  Nontender with good range of motion.      PULMONARY: Respirations are even and non-labored.       CARDIOVASCULAR: Pulses regular by peripheral palpation.       ABDOMEN:  Soft, non-tender, non-distended.        EXTREMITIES:  She has medial greater than lateral joint  line tenderness painful Avtar medially neurovascularly intact thigh and calf were    Ortho Exam      RADIOGRAPHIC FINDINGS:  MRI performed at [facility] dated separate September 29, 2023 left knee impression subtle subchondral fracture involving the anterior medial tibial plateau bone marrow edema can compatible with a spontaneous insufficiency fracture there is also medial compartment moderate chondromalacia subtle horizontal tearing of the meniscus probable patellofemoral chondromalacia with area of full-thickness cartilage loss lateral compartment mild chondromalacia    X-rays 09/14/2023 the left knee see joint space narrowing medial greater than lateral mild calcification at the MCL insertion on the medial femoral epicondyle      .      IMPRESSION:  Left medial tibial plateau Insufficiency fracture with edema involving medial tibial plateau with meniscal tears and underlying at least mild-to-moderate DJD discussed with the recommended internal fixation with calcium phosphate with arthroscopy of the knee also discussed with his not be totally curative secondary to wear being present she does desire proceed we continue her crutches.        PLAN:  Left knee arthroscopy with internal fixation calcium phosphate medial tibial plateau fracture            Richi Bello III      (Subject to voice recognition error, transcription service not allowed)

## 2023-10-10 NOTE — H&P (VIEW-ONLY)
CC:   Chief Complaint   Patient presents with    Left Knee - Pain        PREVIOUS INFO:  Pasquale Calix FNP   Nurse Practitioner  Specialty:  Emergency Medicine  Progress Notes      Signed  Encounter Date:  9/14/2023  Creation Time:  9/14/2023  9:08 AM                 62-year-old female presents ambulatory to orthopedic clinic with a knee immobilizer on left lower extremity.  Reports pain on the lateral side of her knee.  Some pain on the anterior portion of her knee.  Exquisite tenderness on the medial side of her knee.  Also on crutches.  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  States she twisted her knee.  States that she presented to his [facility] on 08/29/2023 where x-rays were obtained of the left knee she was told that they felt she would sprained her knee.  They also recommended she be seen for consideration of MRI examination of her knee.  They issued her knee immobilizer and crutches.  She was taken naproxen sodium but has not found it to be beneficial.  She is employed as a teacher.              HISTORY:   10/10/2023    Tatiana Melendez  is a 62 y.o. worker's comp  left knee injury felt a pop and had pain left knee medially on around August 29, 2023      PAST MEDICAL HISTORY: No past medical history on file.       PAST SURGICAL HISTORY:   Past Surgical History:   Procedure Laterality Date    ANKLE FRACTURE SURGERY      KNEE ARTHROSCOPY            ALLERGIES: Review of patient's allergies indicates:  No Known Allergies     MEDICATIONS :    Current Outpatient Medications:     ascorbic acid, vitamin C, 500 mg Chew, Take 500 mg by mouth., Disp: , Rfl:     aspirin (ASPIR-LOW) 81 MG EC tablet, Aspir-Low 81 mg tablet,delayed release  Take 1 tablet every day by oral route., Disp: , Rfl:     atorvastatin (LIPITOR) 40 MG tablet, atorvastatin 40 mg tablet  TAKE 1/2 (ONE-HALF) TABLET BY MOUTH ONCE  DAILY AT BEDTIME, Disp: , Rfl:     empagliflozin (JARDIANCE) 25 mg tablet, Jardiance 25 mg tablet, Disp: , Rfl:     glimepiride (AMARYL) 4 MG tablet, Take 4 mg by mouth 2 (two) times daily., Disp: , Rfl:     hydroCHLOROthiazide (HYDRODIURIL) 25 MG tablet, Take 12.5 mg by mouth., Disp: , Rfl:     LEVEMIR FLEXTOUCH U-100 INSULN 100 unit/mL (3 mL) InPn pen, SMARTSI Unit(s) SUB-Q Twice Daily, Disp: , Rfl:     lisinopriL-hydrochlorothiazide (PRINZIDE,ZESTORETIC) 20-25 mg Tab, lisinopril 20 mg-hydrochlorothiazide 25 mg tablet  TAKE 1/2 (ONE-HALF) TABLET BY MOUTH ONCE DAILY, Disp: , Rfl:     metFORMIN (GLUCOPHAGE) 500 MG tablet, metformin 500 mg tablet  TAKE 2 TABLETS BY MOUTH TWICE DAILY, Disp: , Rfl:     naproxen (NAPROSYN) 500 MG tablet, Take 1 tablet (500 mg total) by mouth 2 (two) times daily with meals., Disp: 60 tablet, Rfl: 0    pantoprazole (PROTONIX) 40 MG tablet, pantoprazole 40 mg tablet,delayed release  TAKE ONE TABLET BY MOUTH AT 7am AND 7pm, Disp: , Rfl:     ursodioL (ACTIGALL) 300 mg capsule, ursodiol 300 mg capsule  TAKE 1 CAPSULE BY MOUTH TWICE DAILY, Disp: , Rfl:      SOCIAL HISTORY:   Social History     Socioeconomic History    Marital status: Single   Tobacco Use    Smoking status: Never    Smokeless tobacco: Never        ROS    FAMILY HISTORY: No family history on file.       PHYSICAL EXAM: There were no vitals filed for this visit.            There is no height or weight on file to calculate BMI.     In general, this is a well-developed, well-nourished female . The patient is alert, oriented and cooperative.      HEENT:  Normocephalic, atraumatic.  Extraocular movements are intact bilaterally.  The oropharynx is benign.       NECK:  Nontender with good range of motion.      PULMONARY: Respirations are even and non-labored.       CARDIOVASCULAR: Pulses regular by peripheral palpation.       ABDOMEN:  Soft, non-tender, non-distended.        EXTREMITIES:  She has medial greater than lateral joint  line tenderness painful Avtar medially neurovascularly intact thigh and calf were    Ortho Exam      RADIOGRAPHIC FINDINGS:  MRI performed at [facility] dated separate September 29, 2023 left knee impression subtle subchondral fracture involving the anterior medial tibial plateau bone marrow edema can compatible with a spontaneous insufficiency fracture there is also medial compartment moderate chondromalacia subtle horizontal tearing of the meniscus probable patellofemoral chondromalacia with area of full-thickness cartilage loss lateral compartment mild chondromalacia    X-rays 09/14/2023 the left knee see joint space narrowing medial greater than lateral mild calcification at the MCL insertion on the medial femoral epicondyle      .      IMPRESSION:  Left medial tibial plateau Insufficiency fracture with edema involving medial tibial plateau with meniscal tears and underlying at least mild-to-moderate DJD discussed with the recommended internal fixation with calcium phosphate with arthroscopy of the knee also discussed with his not be totally curative secondary to wear being present she does desire proceed we continue her crutches.        PLAN:  Left knee arthroscopy with internal fixation calcium phosphate medial tibial plateau fracture            Richi Bello III      (Subject to voice recognition error, transcription service not allowed)

## 2023-10-18 ENCOUNTER — ANESTHESIA EVENT (OUTPATIENT)
Dept: SURGERY | Facility: HOSPITAL | Age: 63
End: 2023-10-18
Payer: OTHER MISCELLANEOUS

## 2023-10-18 ENCOUNTER — ANESTHESIA (OUTPATIENT)
Dept: SURGERY | Facility: HOSPITAL | Age: 63
End: 2023-10-18
Payer: OTHER MISCELLANEOUS

## 2023-10-18 ENCOUNTER — HOSPITAL ENCOUNTER (OUTPATIENT)
Facility: HOSPITAL | Age: 63
Discharge: HOME | End: 2023-10-18
Attending: ORTHOPAEDIC SURGERY | Admitting: ORTHOPAEDIC SURGERY
Payer: OTHER MISCELLANEOUS

## 2023-10-18 VITALS
SYSTOLIC BLOOD PRESSURE: 157 MMHG | HEART RATE: 76 BPM | HEIGHT: 63 IN | DIASTOLIC BLOOD PRESSURE: 62 MMHG | RESPIRATION RATE: 16 BRPM | OXYGEN SATURATION: 83 % | TEMPERATURE: 98 F | BODY MASS INDEX: 42.52 KG/M2 | WEIGHT: 240 LBS

## 2023-10-18 DIAGNOSIS — Z01.818 PREOPERATIVE EVALUATION OF A MEDICAL CONDITION TO RULE OUT SURGICAL CONTRAINDICATIONS (TAR REQUIRED): ICD-10-CM

## 2023-10-18 DIAGNOSIS — S82.142A CLOSED FRACTURE OF LEFT TIBIAL PLATEAU, INITIAL ENCOUNTER: Primary | ICD-10-CM

## 2023-10-18 LAB
GLUCOSE SERPL-MCNC: 182 MG/DL (ref 70–105)
GLUCOSE SERPL-MCNC: 196 MG/DL (ref 70–105)

## 2023-10-18 PROCEDURE — 97161 PT EVAL LOW COMPLEX 20 MIN: CPT

## 2023-10-18 PROCEDURE — 36000711: Performed by: ORTHOPAEDIC SURGERY

## 2023-10-18 PROCEDURE — 37000009 HC ANESTHESIA EA ADD 15 MINS: Performed by: ORTHOPAEDIC SURGERY

## 2023-10-18 PROCEDURE — 63600175 PHARM REV CODE 636 W HCPCS: Performed by: ANESTHESIOLOGY

## 2023-10-18 PROCEDURE — 71000016 HC POSTOP RECOV ADDL HR: Performed by: ORTHOPAEDIC SURGERY

## 2023-10-18 PROCEDURE — D9220A PRA ANESTHESIA: ICD-10-PCS | Mod: ANES,,, | Performed by: ANESTHESIOLOGY

## 2023-10-18 PROCEDURE — 29881 ARTHRS KNE SRG MNISECTMY M/L: CPT | Mod: LT,,, | Performed by: ORTHOPAEDIC SURGERY

## 2023-10-18 PROCEDURE — 25000003 PHARM REV CODE 250: Performed by: ORTHOPAEDIC SURGERY

## 2023-10-18 PROCEDURE — 27000655: Performed by: ANESTHESIOLOGY

## 2023-10-18 PROCEDURE — 25000003 PHARM REV CODE 250: Performed by: NURSE ANESTHETIST, CERTIFIED REGISTERED

## 2023-10-18 PROCEDURE — 71000015 HC POSTOP RECOV 1ST HR: Performed by: ORTHOPAEDIC SURGERY

## 2023-10-18 PROCEDURE — 29881 PR KNEE SCOPE SINGLE MENISECECTOMY: ICD-10-PCS | Mod: LT,,, | Performed by: ORTHOPAEDIC SURGERY

## 2023-10-18 PROCEDURE — D9220A PRA ANESTHESIA: Mod: ANES,,, | Performed by: ANESTHESIOLOGY

## 2023-10-18 PROCEDURE — 27000177 HC AIRWAY, LARYNGEAL MASK: Performed by: ANESTHESIOLOGY

## 2023-10-18 PROCEDURE — D9220A PRA ANESTHESIA: ICD-10-PCS | Mod: CRNA,,, | Performed by: NURSE ANESTHETIST, CERTIFIED REGISTERED

## 2023-10-18 PROCEDURE — 36000710: Performed by: ORTHOPAEDIC SURGERY

## 2023-10-18 PROCEDURE — 63600175 PHARM REV CODE 636 W HCPCS: Performed by: NURSE ANESTHETIST, CERTIFIED REGISTERED

## 2023-10-18 PROCEDURE — 71000033 HC RECOVERY, INTIAL HOUR: Performed by: ORTHOPAEDIC SURGERY

## 2023-10-18 PROCEDURE — 37000008 HC ANESTHESIA 1ST 15 MINUTES: Performed by: ORTHOPAEDIC SURGERY

## 2023-10-18 PROCEDURE — 82962 GLUCOSE BLOOD TEST: CPT

## 2023-10-18 PROCEDURE — 63600175 PHARM REV CODE 636 W HCPCS: Performed by: ORTHOPAEDIC SURGERY

## 2023-10-18 PROCEDURE — 27201423 OPTIME MED/SURG SUP & DEVICES STERILE SUPPLY: Performed by: ORTHOPAEDIC SURGERY

## 2023-10-18 PROCEDURE — 27000510 HC BLANKET BAIR HUGGER ANY SIZE: Performed by: ANESTHESIOLOGY

## 2023-10-18 PROCEDURE — D9220A PRA ANESTHESIA: Mod: CRNA,,, | Performed by: NURSE ANESTHETIST, CERTIFIED REGISTERED

## 2023-10-18 PROCEDURE — C1713 ANCHOR/SCREW BN/BN,TIS/BN: HCPCS | Performed by: ORTHOPAEDIC SURGERY

## 2023-10-18 PROCEDURE — 27000716 HC OXISENSOR PROBE, ANY SIZE: Performed by: ANESTHESIOLOGY

## 2023-10-18 DEVICE — IMPLANTABLE DEVICE
Type: IMPLANTABLE DEVICE | Site: KNEE | Status: FUNCTIONAL
Brand: SCP® KNEE KIT

## 2023-10-18 RX ORDER — ONDANSETRON 2 MG/ML
4 INJECTION INTRAMUSCULAR; INTRAVENOUS DAILY PRN
Status: COMPLETED | OUTPATIENT
Start: 2023-10-18 | End: 2023-10-18

## 2023-10-18 RX ORDER — MIDAZOLAM HYDROCHLORIDE 1 MG/ML
INJECTION INTRAMUSCULAR; INTRAVENOUS
Status: DISCONTINUED | OUTPATIENT
Start: 2023-10-18 | End: 2023-10-18

## 2023-10-18 RX ORDER — DIPHENHYDRAMINE HYDROCHLORIDE 50 MG/ML
25 INJECTION INTRAMUSCULAR; INTRAVENOUS EVERY 6 HOURS PRN
Status: DISCONTINUED | OUTPATIENT
Start: 2023-10-18 | End: 2023-10-18 | Stop reason: HOSPADM

## 2023-10-18 RX ORDER — HYDROCODONE BITARTRATE AND ACETAMINOPHEN 5; 325 MG/1; MG/1
1 TABLET ORAL EVERY 4 HOURS PRN
Status: DISCONTINUED | OUTPATIENT
Start: 2023-10-18 | End: 2023-10-18 | Stop reason: HOSPADM

## 2023-10-18 RX ORDER — MEPERIDINE HYDROCHLORIDE 25 MG/ML
25 INJECTION INTRAMUSCULAR; INTRAVENOUS; SUBCUTANEOUS ONCE AS NEEDED
Status: DISCONTINUED | OUTPATIENT
Start: 2023-10-18 | End: 2023-10-18 | Stop reason: HOSPADM

## 2023-10-18 RX ORDER — MORPHINE SULFATE 10 MG/ML
4 INJECTION INTRAMUSCULAR; INTRAVENOUS; SUBCUTANEOUS EVERY 4 HOURS PRN
Status: DISCONTINUED | OUTPATIENT
Start: 2023-10-18 | End: 2023-10-18 | Stop reason: HOSPADM

## 2023-10-18 RX ORDER — PROPOFOL 10 MG/ML
VIAL (ML) INTRAVENOUS
Status: DISCONTINUED | OUTPATIENT
Start: 2023-10-18 | End: 2023-10-18

## 2023-10-18 RX ORDER — CEFAZOLIN SODIUM 1 G/3ML
INJECTION, POWDER, FOR SOLUTION INTRAMUSCULAR; INTRAVENOUS
Status: DISCONTINUED | OUTPATIENT
Start: 2023-10-18 | End: 2023-10-18

## 2023-10-18 RX ORDER — LIDOCAINE HYDROCHLORIDE 10 MG/ML
1 INJECTION INFILTRATION; PERINEURAL ONCE AS NEEDED
Status: DISCONTINUED | OUTPATIENT
Start: 2023-10-18 | End: 2023-10-18 | Stop reason: HOSPADM

## 2023-10-18 RX ORDER — HYDROCODONE BITARTRATE AND ACETAMINOPHEN 5; 325 MG/1; MG/1
1 TABLET ORAL EVERY 6 HOURS PRN
Qty: 20 TABLET | Refills: 0 | Status: SHIPPED | OUTPATIENT
Start: 2023-10-18

## 2023-10-18 RX ORDER — SODIUM CHLORIDE 9 MG/ML
INJECTION, SOLUTION INTRAVENOUS CONTINUOUS
Status: DISCONTINUED | OUTPATIENT
Start: 2023-10-18 | End: 2023-10-18 | Stop reason: HOSPADM

## 2023-10-18 RX ORDER — LIDOCAINE HYDROCHLORIDE 20 MG/ML
INJECTION, SOLUTION EPIDURAL; INFILTRATION; INTRACAUDAL; PERINEURAL
Status: DISCONTINUED | OUTPATIENT
Start: 2023-10-18 | End: 2023-10-18

## 2023-10-18 RX ORDER — BUPIVACAINE HYDROCHLORIDE 5 MG/ML
INJECTION, SOLUTION EPIDURAL; INTRACAUDAL
Status: DISCONTINUED | OUTPATIENT
Start: 2023-10-18 | End: 2023-10-18 | Stop reason: HOSPADM

## 2023-10-18 RX ORDER — HYDROMORPHONE HYDROCHLORIDE 2 MG/ML
0.5 INJECTION, SOLUTION INTRAMUSCULAR; INTRAVENOUS; SUBCUTANEOUS EVERY 5 MIN PRN
Status: DISCONTINUED | OUTPATIENT
Start: 2023-10-18 | End: 2023-10-18 | Stop reason: HOSPADM

## 2023-10-18 RX ORDER — FENTANYL CITRATE 50 UG/ML
INJECTION, SOLUTION INTRAMUSCULAR; INTRAVENOUS
Status: DISCONTINUED | OUTPATIENT
Start: 2023-10-18 | End: 2023-10-18

## 2023-10-18 RX ORDER — ONDANSETRON 2 MG/ML
INJECTION INTRAMUSCULAR; INTRAVENOUS
Status: DISCONTINUED | OUTPATIENT
Start: 2023-10-18 | End: 2023-10-18

## 2023-10-18 RX ORDER — ONDANSETRON 4 MG/1
8 TABLET, ORALLY DISINTEGRATING ORAL EVERY 8 HOURS PRN
Status: DISCONTINUED | OUTPATIENT
Start: 2023-10-18 | End: 2023-10-18 | Stop reason: HOSPADM

## 2023-10-18 RX ORDER — HYDROMORPHONE HYDROCHLORIDE 2 MG/ML
INJECTION, SOLUTION INTRAMUSCULAR; INTRAVENOUS; SUBCUTANEOUS
Status: DISCONTINUED | OUTPATIENT
Start: 2023-10-18 | End: 2023-10-18

## 2023-10-18 RX ORDER — MORPHINE SULFATE 10 MG/ML
4 INJECTION INTRAMUSCULAR; INTRAVENOUS; SUBCUTANEOUS EVERY 5 MIN PRN
Status: DISCONTINUED | OUTPATIENT
Start: 2023-10-18 | End: 2023-10-18 | Stop reason: HOSPADM

## 2023-10-18 RX ADMIN — PROPOFOL 110 MG: 10 INJECTION, EMULSION INTRAVENOUS at 03:10

## 2023-10-18 RX ADMIN — LIDOCAINE HYDROCHLORIDE 100 MG: 20 INJECTION, SOLUTION INTRAVENOUS at 03:10

## 2023-10-18 RX ADMIN — CEFAZOLIN 2 G: 1 INJECTION, POWDER, FOR SOLUTION INTRAMUSCULAR; INTRAVENOUS; PARENTERAL at 03:10

## 2023-10-18 RX ADMIN — MIDAZOLAM HYDROCHLORIDE 2 MG: 1 INJECTION, SOLUTION INTRAMUSCULAR; INTRAVENOUS at 03:10

## 2023-10-18 RX ADMIN — ONDANSETRON HYDROCHLORIDE 4 MG: 2 SOLUTION INTRAMUSCULAR; INTRAVENOUS at 06:10

## 2023-10-18 RX ADMIN — SODIUM CHLORIDE: 9 INJECTION, SOLUTION INTRAVENOUS at 12:10

## 2023-10-18 RX ADMIN — HYDROMORPHONE HYDROCHLORIDE 1 MG: 2 INJECTION, SOLUTION INTRAMUSCULAR; INTRAVENOUS; SUBCUTANEOUS at 04:10

## 2023-10-18 RX ADMIN — ONDANSETRON 4 MG: 2 INJECTION INTRAMUSCULAR; INTRAVENOUS at 03:10

## 2023-10-18 RX ADMIN — ONDANSETRON HYDROCHLORIDE 4 MG: 2 SOLUTION INTRAMUSCULAR; INTRAVENOUS at 05:10

## 2023-10-18 RX ADMIN — FENTANYL CITRATE 100 MCG: 50 INJECTION INTRAMUSCULAR; INTRAVENOUS at 03:10

## 2023-10-18 NOTE — OP NOTE
Department of Orthopedic Surgery    Operative Note        Surgery Date: 10/18/2023     PREOPERATIVE DIAGNOSIS:  Closed fracture of left tibial plateau, initial encounter [H75.557G] chondromalacia meniscal tears    POSTOPERATIVE DIAGNOSIS:  Left knee medial tibial plateau insufficiency fracture  Chondromalacia grade 4 of the patella  Chondromalacia grade 3 medial femoral condyle  Chondromalacia grade 2 lateral compartment  Lateral meniscal tear posterior horn    PROCEDURE:  Left knee arthroscopy with partial lateral meniscectomy shaving of chondromalacia  Arthroscopic assisted internal fixation medial tibial plateau with calcium phosphate    IMPLANTS:   Implant Name Type Inv. Item Serial No.  Lot No. LRB No. Used Action   SHAR[employer] KNEE [employer] SCP KNEE KIT     [id number] Left 1 Implanted       SURGEON: Dr. Bello     ASSISTANT:  None    ANESTHESIA: Choice    ESTIMATED BLOOD LOSS:  10 cc    COMPLICATIONS:  None    INDICATION:Tatiana Melendez is a 63 y.o. patient was seen in the office she does have significant degenerative changes of his knee with a edema in the medial tibial plateau consistent with a insufficiency fracture on meniscal tear is probable option of arthroscopy with internal fixation of the medial tibial plateau with calcium phosphate was discussed and recommended discussed with she does have significant degenerative changes she may eventually require a total knee replacement trying to avoid that currently risks benefits discussed    PROCEDURE: The patient was brought to the operating room.  A well-padded tourniquet was placed on the left upper leg.  The left was positioned in the arthroscopy dior.  The right leg was positioned off the end of the table over a well-padded pillow.  The left leg was prepped and draped in normal sterile fashion and Esmarched.  The tourniquet was elevated to 300mm of Mercury.  Superior medial portal was made for inflow, anteromedial portal was made for the scope,  anterior lateral portal was made for the working portal.  The knee was scoped through all compartments.        The knee was scoped through compartments there was significant chondromalacia grade 4 wear on the patella and areas this area was shaved the notch showed the ACL PCL to be intact medially there was significant chondromalacia particularly of the femoral condyle grade 3 some degenerative changes meniscus were shaved there was a christiane tear of the lateral meniscus resected back to stable surface using biters and shaved smooth less wear laterally probably grade 2.  After irrigating out the knee copiously using the Julio César subchondroplasty equipment drove a pin from lateral to medial tibial plateau region and injected a proximally 4 cc of calcium phosphate under C-arm guidance verification    The knee was scoped through compartments and irrigated out copiously. A drain was placed through the superomedial portal.  Portal sites were injected with plain Marcaine and closed with simple Nylon stitches.  A sterile bulk dressing was applied to the knee.  A compressive dressing from foot to thigh was applied.  The patient tolerated the procedure well without complications.                  Richi Bello III

## 2023-10-18 NOTE — ANESTHESIA POSTPROCEDURE EVALUATION
Anesthesia Post Evaluation    Patient: Tatiana Melendez    Procedure(s) Performed: Procedure(s) (LRB):  FIXATION, FRACTURE, MEDIAL TIBIA, PLATEAU, ARTHROSCOPIC (Left)  ARTHROSCOPY, KNEE, WITH MENISCECTOMY (Left)    Final Anesthesia Type: general      Patient location during evaluation: PACU  Patient participation: Yes- Able to Participate  Level of consciousness: awake and alert and oriented  Post-procedure vital signs: reviewed and stable  Pain management: adequate  Airway patency: patent  KUSUM mitigation strategies: Multimodal analgesia  PONV status at discharge: nausea (controlled)  Anesthetic complications: no      Cardiovascular status: hemodynamically stable  Respiratory status: unassisted and spontaneous ventilation  Hydration status: euvolemic  Follow-up not needed.          Vitals Value Taken Time   /68 10/18/23 1731   Temp 36.7 °C (98.1 °F) 10/18/23 1652   Pulse 75 10/18/23 1732   Resp 16 10/18/23 1730   SpO2 96 % 10/18/23 1732   Vitals shown include unvalidated device data.      No case tracking events are documented in the log.      Pain/Eric Score: No data recorded

## 2023-10-18 NOTE — BRIEF OP NOTE
[facility] - Orthopedic Periop Services  Brief Operative Note     Surgery Date: 10/18/2023     PREOPERATIVE DIAGNOSIS:  Closed fracture of left tibial plateau, initial encounter [I31.980A] chondromalacia meniscal tears    POSTOPERATIVE DIAGNOSIS:  Left knee medial tibial plateau insufficiency fracture  Chondromalacia grade 4 of the patella  Chondromalacia grade 3 medial femoral condyle  Chondromalacia grade 2 lateral compartment  Lateral meniscal tear posterior horn    PROCEDURE:  Left knee arthroscopy with partial lateral meniscectomy shaving of chondromalacia  Arthroscopic assisted internal fixation medial tibial plateau with calcium phosphate    IMPLANTS:   Implant Name Type Inv. Item Serial No.  Lot No. LRB No. Used Action   SHARIF KNEE [employer] SCP KNEE KIT     [id number] Left 1 Implanted       SURGEON: Dr. Bello     ASSISTANT:  None    ANESTHESIA: Choice    ESTIMATED BLOOD LOSS:  10 cc    COMPLICATIONS:  None      Specimens:   Specimen (24h ago, onward)      None              Discharge Note    OUTCOME: Patient tolerated treatment/procedure well without complication and is now ready for discharge.    DISPOSITION: Home or Self Care    FINAL DIAGNOSIS:  <principal problem not specified>    FOLLOWUP: In clinic    DISCHARGE INSTRUCTIONS:    Discharge Procedure Orders   Diet general     Call MD for:  temperature >100.4     Call MD for:  redness, tenderness, or signs of infection (pain, swelling, redness, odor or green/yellow discharge around incision site)     Leave dressing on - Keep it clean, dry, and intact until clinic visit     Weight bearing as tolerated         Richi Bello III

## 2023-10-18 NOTE — ANESTHESIA PREPROCEDURE EVALUATION
10/18/2023  Tatiana Melendez is a 63 y.o., female.      Pre-op Assessment    I have reviewed the Patient Summary Reports.     I have reviewed the Nursing Notes. I have reviewed the NPO Status.   I have reviewed the Medications.     Review of Systems  Anesthesia Hx:  No problems with previous Anesthesia  Denies Family Hx of Anesthesia complications.   Denies Personal Hx of Anesthesia complications.   Social:  Non-Smoker, No Alcohol Use    Cardiovascular:   Exercise tolerance: good Hypertension hyperlipidemia ECG has been reviewed.  Functional Capacity good / => 4 METS    Renal/:   Chronic Renal Disease, CKD    Endocrine:   Diabetes, type 2  Morbid Obesity / BMI > 40      Physical Exam  General: Well nourished, Cooperative and Alert    Airway:  Mallampati: II   Mouth Opening: Normal  TM Distance: Normal  Tongue: Normal  Neck ROM: Normal ROM    Dental:  Intact    Chest/Lungs:  Clear to auscultation, Normal Respiratory Rate    Heart:  Rate: Normal  Rhythm: Regular Rhythm        Chemistry        Component Value Date/Time     10/10/2023 1020    K 4.1 10/10/2023 1020     10/10/2023 1020    CO2 29 10/10/2023 1020    BUN 41 (H) 10/10/2023 1020    CREATININE 1.42 (H) 10/10/2023 1020     (H) 10/10/2023 1020        Component Value Date/Time    CALCIUM 9.8 10/10/2023 1020    ALKPHOS 81 10/10/2023 1020    AST 16 10/10/2023 1020    ALT 34 10/10/2023 1020    BILITOT 0.3 10/10/2023 1020        Lab Results   Component Value Date    WBC 11.35 (H) 10/10/2023    HGB 11.8 (L) 10/10/2023    HCT 36.3 (L) 10/10/2023     10/10/2023     No results found for this or any previous visit.      Anesthesia Plan  Type of Anesthesia, risks & benefits discussed:    Anesthesia Type: Gen Supraglottic Airway  Intra-op Monitoring Plan: Standard ASA Monitors  Post Op Pain Control Plan: multimodal analgesia  Induction:   IV  Airway Plan: Direct, Post-Induction  Informed Consent: Informed consent signed with the Patient and all parties understand the risks and agree with anesthesia plan.  All questions answered.   ASA Score: 3  Day of Surgery Review of History & Physical: H&P Update referred to the surgeon/provider.I have interviewed and examined the patient. I have reviewed the patient's H&P dated: There are no significant changes.     Ready For Surgery From Anesthesia Perspective.     .

## 2023-10-18 NOTE — PT/OT/SLP EVAL
Physical Therapy Evaluation    Patient Name:  Tatiana Melendez   MRN:  [id number]    Recommendations:     Discharge Recommendations: Low Intensity Therapy   Discharge Equipment Recommendations: none   Barriers to discharge: None    Assessment:     Tatiana Melendez is a 63 y.o. female admitted with a medical diagnosis of <principal problem not specified>.  She presents with the following impairments/functional limitations:   Patient with good understanding of post op education.    Rehab Prognosis: Good; patient would benefit from acute skilled PT services to address these deficits and reach maximum level of function.    Recent Surgery: Procedure(s) (LRB):  FIXATION, FRACTURE, MEDIAL TIBIA, PLATEAU, ARTHROSCOPIC (Left)  ARTHROSCOPY, KNEE, WITH MENISCECTOMY (Left) Day of Surgery    Plan:     During this hospitalization, patient to be seen 1 x/week to address the identified rehab impairments via gait training, therapeutic activities and progress toward the following goals:    Plan of Care Expires:  10/18/23    Subjective     Chief Complaint: nausea  Patient/Family Comments/goals: Plans on dc home today  Pain/Comfort:  Pain Rating 1: 6/10  Location - Side 1: Left  Location 1: knee  Pain Addressed 1: Cessation of Activity, Pre-medicate for activity    Patients cultural, spiritual, Muslim conflicts given the current situation: no    Living Environment:  Lives with family  Prior to admission, patients level of function was independent.  Equipment used at home: crutches.  DME owned (not currently used): none.  Upon discharge, patient will have assistance from family.    Objective:     Communicated with nurse prior to session.  Patient found supine with    upon PT entry to room.    General Precautions: Standard,    Orthopedic Precautions:LLE weight bearing as tolerated   Braces:    Respiratory Status: Room air    Exams:  na    Functional Mobility:  Gait: ambulated 10 feet with crutches wbat      AM-PAC 6 CLICK MOBILITY  Total  Score:18       Treatment & Education:  Wbat with crutches  Hep ascope protocol    Patient left supine with all lines intact.    GOALS:   Multidisciplinary Problems       Physical Therapy Goals       Not on file                    History:     Past Medical History:   Diagnosis Date    Diabetes mellitus     Hypertension     Renal disorder        Past Surgical History:   Procedure Laterality Date    ANKLE FRACTURE SURGERY      KNEE ARTHROSCOPY         Time Tracking:     PT Received On: 10/18/23  PT Start Time: 1840     PT Stop Time: 1900  PT Total Time (min): 20 min     Billable Minutes: Evaluation 20      10/18/2023

## 2023-10-18 NOTE — ANESTHESIA PROCEDURE NOTES
Intubation    Date/Time: 10/18/2023 3:40 PM    Performed by: Gregg Harrell CRNA  Authorized by: Maninder Marroquin DO    Intubation:     Induction:  Intravenous    Intubated:  Postinduction    Mask Ventilation:  Easy mask    Attempted By:  CRNA    Difficult Airway Encountered?: No      Complications:  None    Airway Device:  Supraglottic airway/LMA    Airway Device Size:  3.0    Placement Verified By:  Capnometry    Complicating Factors:  None    Findings Post-Intubation:  BS equal bilateral

## 2023-10-18 NOTE — TRANSFER OF CARE
"Anesthesia Transfer of Care Note    Patient: Tatiana Melendez    Procedure(s) Performed: Procedure(s) (LRB):  FIXATION, FRACTURE, MEDIAL TIBIA, PLATEAU, ARTHROSCOPIC (Left)  ARTHROSCOPY, KNEE, WITH MENISCECTOMY (Left)    Patient location: PACU    Anesthesia Type: general    Transport from OR: Transported from OR on 6-10 L/min O2 by face mask with adequate spontaneous ventilation    Post pain: adequate analgesia    Post assessment: no apparent anesthetic complications    Post vital signs: stable    Level of consciousness: awake and alert    Nausea/Vomiting: no nausea/vomiting    Complications: none    Transfer of care protocol was followed      Last vitals:   Visit Vitals  BP (!) 151/82 (BP Location: Left arm, Patient Position: Lying)   Pulse 82   Temp 36.7 °C (98.1 °F) (Oral)   Resp 12   Ht 5' 3" (1.6 m)   Wt 108.9 kg (240 lb)   SpO2 100%   Breastfeeding No   BMI 42.51 kg/m²     "

## 2023-10-19 RX ORDER — METAXALONE 800 MG/1
800 TABLET ORAL 3 TIMES DAILY PRN
Qty: 20 TABLET | Refills: 0 | Status: SHIPPED | OUTPATIENT
Start: 2023-10-19 | End: 2023-10-29

## 2023-11-01 DIAGNOSIS — S82.142A CLOSED FRACTURE OF LEFT TIBIAL PLATEAU, INITIAL ENCOUNTER: Primary | ICD-10-CM

## 2023-11-02 ENCOUNTER — OFFICE VISIT (OUTPATIENT)
Dept: ORTHOPEDICS | Facility: CLINIC | Age: 63
End: 2023-11-02
Payer: COMMERCIAL

## 2023-11-02 ENCOUNTER — HOSPITAL ENCOUNTER (OUTPATIENT)
Dept: RADIOLOGY | Facility: HOSPITAL | Age: 63
Discharge: HOME | End: 2023-11-02
Attending: ORTHOPAEDIC SURGERY
Payer: COMMERCIAL

## 2023-11-02 DIAGNOSIS — S82.142A CLOSED FRACTURE OF LEFT TIBIAL PLATEAU, INITIAL ENCOUNTER: ICD-10-CM

## 2023-11-02 DIAGNOSIS — Z09 FOLLOW-UP EXAMINATION, FOLLOWING OTHER SURGERY: Primary | ICD-10-CM

## 2023-11-02 PROCEDURE — 99212 OFFICE O/P EST SF 10 MIN: CPT | Mod: PBBFAC | Performed by: ORTHOPAEDIC SURGERY

## 2023-11-02 PROCEDURE — 73560 X-RAY EXAM OF KNEE 1 OR 2: CPT | Mod: TC,LT

## 2023-11-02 PROCEDURE — 73560 XR KNEE 1 OR 2 VIEW LEFT: ICD-10-PCS | Mod: 26,LT,, | Performed by: ORTHOPAEDIC SURGERY

## 2023-11-02 PROCEDURE — 4010F PR ACE/ARB THEARPY RXD/TAKEN: ICD-10-PCS | Mod: S$GLB,,, | Performed by: ORTHOPAEDIC SURGERY

## 2023-11-02 PROCEDURE — 73560 X-RAY EXAM OF KNEE 1 OR 2: CPT | Mod: 26,LT,, | Performed by: ORTHOPAEDIC SURGERY

## 2023-11-02 PROCEDURE — 99024 POSTOP FOLLOW-UP VISIT: CPT | Mod: S$GLB,,, | Performed by: ORTHOPAEDIC SURGERY

## 2023-11-02 PROCEDURE — 99024 PR POST-OP FOLLOW-UP VISIT: ICD-10-PCS | Mod: S$GLB,,, | Performed by: ORTHOPAEDIC SURGERY

## 2023-11-02 PROCEDURE — 4010F ACE/ARB THERAPY RXD/TAKEN: CPT | Mod: S$GLB,,, | Performed by: ORTHOPAEDIC SURGERY

## 2023-11-02 NOTE — PROGRESS NOTES
CC:   Chief Complaint   Patient presents with    Post-op Evaluation     LT KNEE SCOPE WITH MED TIB PLATEAU FIXATION 10/18 -2 WEEKS        PREVIOUS INFO:    HISTORY:   10/10/2023    Tatiana Melendez  is a 62 y.o. worker's comp  left knee injury felt a pop and had pain left knee medially on around August 29, 2023    Surgery Date: 10/18/2023      PREOPERATIVE DIAGNOSIS:  Closed fracture of left tibial plateau, initial encounter [S82.977A] chondromalacia meniscal tears     POSTOPERATIVE DIAGNOSIS:  Left knee medial tibial plateau insufficiency fracture  Chondromalacia grade 4 of the patella  Chondromalacia grade 3 medial femoral condyle  Chondromalacia grade 2 lateral compartment  Lateral meniscal tear posterior horn     PROCEDURE:  Left knee arthroscopy with partial lateral meniscectomy shaving of chondromalacia  Arthroscopic assisted internal fixation medial tibial plateau with calcium phosphate      HISTORY:   11/2/2023    Tatiana Melendez  is a 63 y.o. workman's comp injury left knee injury was on August 29, 2023 had insufficiency fracture involving the medial tibial plateau underwent subchondroplasty date of surgery 10/18/2023 she had significant arthritis at that time please see above she has returned to work I think yesterday      PAST MEDICAL HISTORY:   Past Medical History:   Diagnosis Date    Diabetes mellitus     Hypertension     Renal disorder           PAST SURGICAL HISTORY:   Past Surgical History:   Procedure Laterality Date    ANKLE FRACTURE SURGERY      FIXATION, FRACTURE, TIBIA, PLATEAU, ARTHROSCOPIC Left 10/18/2023    Procedure: FIXATION, FRACTURE, MEDIAL TIBIA, PLATEAU, ARTHROSCOPIC;  Surgeon: Richi Bello III, MD;  Location: [facility];  Service: Orthopedics;  Laterality: Left;    KNEE ARTHROSCOPY      KNEE ARTHROSCOPY W/ MENISCECTOMY Left 10/18/2023    Procedure: ARTHROSCOPY, KNEE, WITH MENISCECTOMY;  Surgeon: Richi Bello III, MD;  Location: [facility] OR;  Service:  Orthopedics;  Laterality: Left;  LATERAL          ALLERGIES:   Review of patient's allergies indicates:   Allergen Reactions    Naproxen         MEDICATIONS :    Current Outpatient Medications:     ascorbic acid, vitamin C, 500 mg Chew, Take 500 mg by mouth., Disp: , Rfl:     aspirin (ASPIR-LOW) 81 MG EC tablet, Aspir-Low 81 mg tablet,delayed release  Take 1 tablet every day by oral route., Disp: , Rfl:     atorvastatin (LIPITOR) 40 MG tablet, atorvastatin 40 mg tablet  TAKE 1/2 (ONE-HALF) TABLET BY MOUTH ONCE DAILY AT BEDTIME, Disp: , Rfl:     empagliflozin (JARDIANCE) 25 mg tablet, Jardiance 25 mg tablet, Disp: , Rfl:     glimepiride (AMARYL) 4 MG tablet, Take 4 mg by mouth 2 (two) times daily., Disp: , Rfl:     hydroCHLOROthiazide (HYDRODIURIL) 25 MG tablet, Take 12.5 mg by mouth., Disp: , Rfl:     HYDROcodone-acetaminophen (NORCO) 5-325 mg per tablet, Take 1 tablet by mouth every 6 (six) hours as needed for Pain., Disp: 20 tablet, Rfl: 0    LEVEMIR FLEXTOUCH U-100 INSULN 100 unit/mL (3 mL) InPn pen, SMARTSI Unit(s) SUB-Q Twice Daily, Disp: , Rfl:     lisinopriL-hydrochlorothiazide (PRINZIDE,ZESTORETIC) 20-25 mg Tab, lisinopril 20 mg-hydrochlorothiazide 25 mg tablet  TAKE 1/2 (ONE-HALF) TABLET BY MOUTH ONCE DAILY, Disp: , Rfl:     metFORMIN (GLUCOPHAGE) 500 MG tablet, metformin 500 mg tablet  TAKE 2 TABLETS BY MOUTH TWICE DAILY, Disp: , Rfl:     pantoprazole (PROTONIX) 40 MG tablet, pantoprazole 40 mg tablet,delayed release  TAKE ONE TABLET BY MOUTH AT 7am AND 7pm, Disp: , Rfl:     ursodioL (ACTIGALL) 300 mg capsule, ursodiol 300 mg capsule  TAKE 1 CAPSULE BY MOUTH TWICE DAILY, Disp: , Rfl:      SOCIAL HISTORY:   Social History     Socioeconomic History    Marital status: Single   Tobacco Use    Smoking status: Never    Smokeless tobacco: Never        ROS    FAMILY HISTORY: No family history on file.       PHYSICAL EXAM: There were no vitals filed for this visit.            There is no height or weight on  file to calculate BMI.     In general, this is a well-developed, well-nourished female . The patient is alert, oriented and cooperative.      HEENT:  Normocephalic, atraumatic.  Extraocular movements are intact bilaterally.  The oropharynx is benign.       NECK:  Nontender with good range of motion.      PULMONARY: Respirations are even and non-labored.       CARDIOVASCULAR: Pulses regular by peripheral palpation.       ABDOMEN:  Soft, non-tender, non-distended.        EXTREMITIES:  Patient has no swelling of the knee has some tenderness medially to palpation no bruising has excellent motion of her knee    Ortho Exam      RADIOGRAPHIC FINDINGS:  Left knee AP and lateral views increased density involving the medial tibial plateau consistent with known history of subchondroplasty.      .      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         Richi Bello III      (Subject to voice recognition error, transcription service not allowed)

## 2023-11-02 NOTE — LETTER
November 2, 2023      [facility]  [address] [place] [id number]  Phone: [phone]  Fax: [phone]       Patient: Tatiana Melendez   YOB: 1960  Date of Visit: 11/02/2023    To Whom It May Concern:    Stephen Melendez  had surgery on 10/18/23. Please excuse her for the dates of 10/18/23 through 10/31/23. The patient may return to work on 11/2/23 with no restrictions. If you have any questions or concerns, or if I can be of further assistance, please do not hesitate to contact me.    Sincerely,    Vera Bello III, M.D.

## 2023-11-02 NOTE — LETTER
November 2, 2023      [facility]  [address] [place] [id number]  Phone: [phone]  Fax: [phone]       Patient: Tatiana Melendez   YOB: 1960  Date of Visit: 11/02/2023    To Whom It May Concern:    Stephen Melendez  was at [facility] on 11/02/2023. She had surgery on 10/18/23. Please excuse her for the dates 10/18/23 though 10/31/23. The patient may return to work on 11/2/23 with restrictions. She is only permitted for light standing and or seated duties. If you have any questions or concerns, or if I can be of further assistance, please do not hesitate to contact me.    Sincerely,    Vera Bello III, M.D.

## 2023-11-21 ENCOUNTER — OFFICE VISIT (OUTPATIENT)
Dept: ORTHOPEDICS | Facility: CLINIC | Age: 63
End: 2023-11-21
Payer: COMMERCIAL

## 2023-11-21 ENCOUNTER — HOSPITAL ENCOUNTER (OUTPATIENT)
Dept: RADIOLOGY | Facility: HOSPITAL | Age: 63
Discharge: HOME | End: 2023-11-21
Attending: ORTHOPAEDIC SURGERY
Payer: COMMERCIAL

## 2023-11-21 DIAGNOSIS — S82.142A CLOSED FRACTURE OF LEFT TIBIAL PLATEAU, INITIAL ENCOUNTER: Primary | ICD-10-CM

## 2023-11-21 DIAGNOSIS — Z09 FOLLOW-UP EXAMINATION, FOLLOWING OTHER SURGERY: Primary | ICD-10-CM

## 2023-11-21 DIAGNOSIS — S82.142A CLOSED FRACTURE OF LEFT TIBIAL PLATEAU, INITIAL ENCOUNTER: ICD-10-CM

## 2023-11-21 PROCEDURE — 99024 POSTOP FOLLOW-UP VISIT: CPT | Mod: S$GLB,,, | Performed by: ORTHOPAEDIC SURGERY

## 2023-11-21 PROCEDURE — 99024 PR POST-OP FOLLOW-UP VISIT: ICD-10-PCS | Mod: S$GLB,,, | Performed by: ORTHOPAEDIC SURGERY

## 2023-11-21 PROCEDURE — 73560 X-RAY EXAM OF KNEE 1 OR 2: CPT | Mod: TC,LT

## 2023-11-21 PROCEDURE — 4010F PR ACE/ARB THEARPY RXD/TAKEN: ICD-10-PCS | Mod: S$GLB,,, | Performed by: ORTHOPAEDIC SURGERY

## 2023-11-21 PROCEDURE — 4010F ACE/ARB THERAPY RXD/TAKEN: CPT | Mod: S$GLB,,, | Performed by: ORTHOPAEDIC SURGERY

## 2023-11-21 PROCEDURE — 73560 XR KNEE 1 OR 2 VIEW LEFT: ICD-10-PCS | Mod: 26,LT,, | Performed by: ORTHOPAEDIC SURGERY

## 2023-11-21 PROCEDURE — 73560 X-RAY EXAM OF KNEE 1 OR 2: CPT | Mod: 26,LT,, | Performed by: ORTHOPAEDIC SURGERY

## 2023-11-21 PROCEDURE — 99212 OFFICE O/P EST SF 10 MIN: CPT | Mod: PBBFAC | Performed by: ORTHOPAEDIC SURGERY

## 2023-11-21 NOTE — PROGRESS NOTES
CC:    Chief Complaint   Patient presents with    Post-op Evaluation     LT KNEE SCOPE MED TIB PLAT FIXATION 10/18 - 5 WEEKS            Previos History :   October 18, 2023  POSTOPERATIVE DIAGNOSIS:  Left knee medial tibial plateau insufficiency fracture  Chondromalacia grade 4 of the patella  Chondromalacia grade 3 medial femoral condyle  Chondromalacia grade 2 lateral compartment  Lateral meniscal tear posterior horn     PROCEDURE:  Left knee arthroscopy with partial lateral meniscectomy shaving of chondromalacia  Arthroscopic assisted internal fixation medial tibial plateau with calcium phosphate       HISTORY:   11/2/2023    Tatiana Melendez  is a 63 y.o. workman's comp injury left knee injury was on August 29, 2023 had insufficiency fracture involving the medial tibial plateau underwent subchondroplasty date of surgery 10/18/2023 she had significant arthritis at that time please see above she has returned to work I think yesterday       History:  11/21/2023   Tatiana Melendez is a 63 y.o.  status post status post left knee arthroscopy had significant chondromalacia see above and had subchondroplasty for edema medially on the tibial plateau  worker's comp  patient is currently 5 weeks out from her surgery on October 18, 2023  Patient returned to work 2 weeks postop she is using crutches at work using some otherwise on longer distances this still aches and hurts some she states DJD if she can take it would be beneficial told him to talked to him again she definitely do Voltaren gel on her knee        PE:   No effusion full extension flexion about 120 not hot or red looks good      Radiology:  Left knee AP and lateral views increased bone marrow  density on the medial tibial plateau consistent with internal fixation of known calcium phosphate        Ass/Plan:  Told her she definitely does not have to use the crutches she likes 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  Do not need x-rays        Richi Bello III, MD    Subject to voice recognition errors,  transcription services are not allowed

## 2024-01-02 ENCOUNTER — OFFICE VISIT (OUTPATIENT)
Dept: ORTHOPEDICS | Facility: CLINIC | Age: 64
End: 2024-01-02
Payer: COMMERCIAL

## 2024-01-02 DIAGNOSIS — Z09 FOLLOW-UP EXAMINATION, FOLLOWING OTHER SURGERY: Primary | ICD-10-CM

## 2024-01-02 PROCEDURE — 99024 POSTOP FOLLOW-UP VISIT: CPT | Mod: S$GLB,,, | Performed by: ORTHOPAEDIC SURGERY

## 2024-01-02 PROCEDURE — 99212 OFFICE O/P EST SF 10 MIN: CPT | Mod: PBBFAC | Performed by: ORTHOPAEDIC SURGERY

## 2024-01-02 NOTE — PROGRESS NOTES
CC:   Chief Complaint   Patient presents with    Follow-up     LT KNEE SCOPE MEDIAL TIBIAL PLATEAU ARTHROSCOPIC FIXATION 10/18 (10WKS)        PREVIOUS INFO:   October 18, 2023  POSTOPERATIVE DIAGNOSIS:  Left knee medial tibial plateau insufficiency fracture  Chondromalacia grade 4 of the patella  Chondromalacia grade 3 medial femoral condyle  Chondromalacia grade 2 lateral compartment  Lateral meniscal tear posterior horn     PROCEDURE:  Left knee arthroscopy with partial lateral meniscectomy shaving of chondromalacia  Arthroscopic assisted internal fixation medial tibial plateau with calcium phosphate       History:  11/21/2023   Tatiana Melendez is a 63 y.o.  status post status post left knee arthroscopy had significant chondromalacia see above and had subchondroplasty for edema medially on the tibial plateau  worker's comp  patient is currently 5 weeks out from her surgery on October 18, 2023  Patient returned to work 2 weeks postop she is using crutches at work using some otherwise on longer distances this still aches and hurts some she states DJD if she can take it would be beneficial told him to talked to him again she definitely do Voltaren gel on her knee           HISTORY:   1/2/2024    Tatiana Melendez  is a 63 y.o. 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  Patient states her knee is doing great she has been back at work thinks she went back after a few weeks      PAST MEDICAL HISTORY:   Past Medical History:   Diagnosis Date    Diabetes mellitus     Hypertension     Renal disorder           PAST SURGICAL HISTORY:   Past Surgical History:   Procedure Laterality Date    ANKLE FRACTURE SURGERY      FIXATION, FRACTURE, TIBIA, PLATEAU, ARTHROSCOPIC Left 10/18/2023    Procedure: FIXATION, FRACTURE, MEDIAL TIBIA, PLATEAU, ARTHROSCOPIC;  Surgeon: Richi Bello  III, MD;  Location: [facility];  Service: Orthopedics;  Laterality: Left;    KNEE ARTHROSCOPY      KNEE ARTHROSCOPY W/ MENISCECTOMY Left 10/18/2023    Procedure: ARTHROSCOPY, KNEE, WITH MENISCECTOMY;  Surgeon: Richi Bello III, MD;  Location: [facility];  Service: Orthopedics;  Laterality: Left;  LATERAL          ALLERGIES:   Review of patient's allergies indicates:   Allergen Reactions    Naproxen         MEDICATIONS :    Current Outpatient Medications:     ascorbic acid, vitamin C, 500 mg Chew, Take 500 mg by mouth., Disp: , Rfl:     aspirin (ASPIR-LOW) 81 MG EC tablet, Aspir-Low 81 mg tablet,delayed release  Take 1 tablet every day by oral route., Disp: , Rfl:     atorvastatin (LIPITOR) 40 MG tablet, atorvastatin 40 mg tablet  TAKE 1/2 (ONE-HALF) TABLET BY MOUTH ONCE DAILY AT BEDTIME, Disp: , Rfl:     empagliflozin (JARDIANCE) 25 mg tablet, Jardiance 25 mg tablet, Disp: , Rfl:     glimepiride (AMARYL) 4 MG tablet, Take 4 mg by mouth 2 (two) times daily., Disp: , Rfl:     hydroCHLOROthiazide (HYDRODIURIL) 25 MG tablet, Take 12.5 mg by mouth., Disp: , Rfl:     HYDROcodone-acetaminophen (NORCO) 5-325 mg per tablet, Take 1 tablet by mouth every 6 (six) hours as needed for Pain., Disp: 20 tablet, Rfl: 0    LEVEMIR FLEXTOUCH U-100 INSULN 100 unit/mL (3 mL) InPn pen, SMARTSI Unit(s) SUB-Q Twice Daily, Disp: , Rfl:     lisinopriL-hydrochlorothiazide (PRINZIDE,ZESTORETIC) 20-25 mg Tab, lisinopril 20 mg-hydrochlorothiazide 25 mg tablet  TAKE 1/2 (ONE-HALF) TABLET BY MOUTH ONCE DAILY, Disp: , Rfl:     metFORMIN (GLUCOPHAGE) 500 MG tablet, metformin 500 mg tablet  TAKE 2 TABLETS BY MOUTH TWICE DAILY, Disp: , Rfl:     pantoprazole (PROTONIX) 40 MG tablet, pantoprazole 40 mg tablet,delayed release  TAKE ONE TABLET BY MOUTH AT 7am AND 7pm, Disp: , Rfl:     ursodioL (ACTIGALL) 300 mg capsule, ursodiol 300 mg capsule  TAKE 1 CAPSULE BY MOUTH TWICE DAILY, Disp: , Rfl:      SOCIAL HISTORY:   Social History      Socioeconomic History    Marital status: Single   Tobacco Use    Smoking status: Never    Smokeless tobacco: Never        ROS    FAMILY HISTORY: No family history on file.       PHYSICAL EXAM: There were no vitals filed for this visit.            There is no height or weight on file to calculate BMI.     In general, this is a well-developed, well-nourished female . The patient is alert, oriented and cooperative.      HEENT:  Normocephalic, atraumatic.  Extraocular movements are intact bilaterally.  The oropharynx is benign.       NECK:  Nontender with good range of motion.      PULMONARY: Respirations are even and non-labored.       CARDIOVASCULAR: Pulses regular by peripheral palpation.       ABDOMEN:  Soft, non-tender, non-distended.        EXTREMITIES:  On physical exam full extension flexion about 125 thigh and calf were soft she is very pleased    Ortho Exam      RADIOGRAPHIC FINDINGS:  None today      .      IMPRESSION:  Patient is doing well following subchondroplasty I did discuss with the she has significant arthritis in her knee may eventually require total knee replacement secondary to arthritis not her fracture    PLAN:  I will see her back on a p.r.n. basis doing well  We will consider today the date of maximum medical improvement  She is released to regular duties  Impairment rating based on her fracture 2% to the person him 5% to the lower extremity      No follow-ups on file.         Richi Bello III      (Subject to voice recognition error, transcription service not allowed)

## 2024-01-02 NOTE — LETTER
January 2, 2024      [facility]  [address] [place] [id number]  Phone: [phone]  Fax: [phone]       Patient: Tatiana Melendez   YOB: 1960  Date of Visit: 01/02/2024    To Whom It May Concern:    Stephen Melendez  was at [facility] on 01/02/2024. The patient may return to work/school on 1/3/24 with no restrictions. If you have any questions or concerns, or if I can be of further assistance, please do not hesitate to contact me.    Sincerely,    Lotus Bello III, M.D.

## 2024-01-03 ENCOUNTER — OFFICE VISIT (OUTPATIENT)
Dept: INTERNAL MEDICINE | Facility: CLINIC | Age: 64
End: 2024-01-03
Payer: COMMERCIAL

## 2024-01-03 VITALS
HEART RATE: 96 BPM | BODY MASS INDEX: 41.93 KG/M2 | HEIGHT: 63 IN | WEIGHT: 236.63 LBS | OXYGEN SATURATION: 95 % | DIASTOLIC BLOOD PRESSURE: 70 MMHG | SYSTOLIC BLOOD PRESSURE: 125 MMHG | RESPIRATION RATE: 18 BRPM

## 2024-01-03 DIAGNOSIS — E11.9 TYPE 2 DIABETES MELLITUS TREATED WITH INSULIN: ICD-10-CM

## 2024-01-03 DIAGNOSIS — M85.859 OSTEOPENIA OF NECK OF FEMUR, UNSPECIFIED LATERALITY: Primary | ICD-10-CM

## 2024-01-03 DIAGNOSIS — Z79.4 TYPE 2 DIABETES MELLITUS TREATED WITH INSULIN: ICD-10-CM

## 2024-01-03 DIAGNOSIS — Z78.0 MENOPAUSE: ICD-10-CM

## 2024-01-03 DIAGNOSIS — S82.142S CLOSED FRACTURE OF LEFT TIBIAL PLATEAU, SEQUELA: ICD-10-CM

## 2024-01-03 PROCEDURE — 3008F BODY MASS INDEX DOCD: CPT | Mod: S$GLB,,, | Performed by: NURSE PRACTITIONER

## 2024-01-03 PROCEDURE — 3078F DIAST BP <80 MM HG: CPT | Mod: S$GLB,,, | Performed by: NURSE PRACTITIONER

## 2024-01-03 PROCEDURE — 1159F MED LIST DOCD IN RCRD: CPT | Mod: S$GLB,,, | Performed by: NURSE PRACTITIONER

## 2024-01-03 PROCEDURE — 1160F RVW MEDS BY RX/DR IN RCRD: CPT | Mod: S$GLB,,, | Performed by: NURSE PRACTITIONER

## 2024-01-03 PROCEDURE — 3074F SYST BP LT 130 MM HG: CPT | Mod: S$GLB,,, | Performed by: NURSE PRACTITIONER

## 2024-01-03 PROCEDURE — 99214 OFFICE O/P EST MOD 30 MIN: CPT | Mod: S$GLB,,, | Performed by: NURSE PRACTITIONER

## 2024-01-03 PROCEDURE — 99215 OFFICE O/P EST HI 40 MIN: CPT | Mod: PBBFAC | Performed by: NURSE PRACTITIONER
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
## 2025-07-17 ENCOUNTER — TELEPHONE (OUTPATIENT)
Dept: ORTHOPEDICS | Facility: CLINIC | Age: 65
End: 2025-07-17
Payer: COMMERCIAL

## 2025-07-17 NOTE — TELEPHONE ENCOUNTER
Spoke to the patient- after Dr. Bello reviewed the xray he recommends a shoulder specialist- the patient understands     Copied from CRM #[id number]. Topic: Appointments - Appointment Access  >> Jul 17, 2025 10:44 AM Judith Santo wrote:  Who Called: Tatiana Melendez    Caller is requesting a sooner appointment. Caller declined first available appointment listed below. Caller will not accept being placed on the waitlist and is requesting a message be sent to doctor.    When is the first available appointment?N/A  Options offered (Virtual Visit, Urgent Care):   Symptoms:broken arm       Preferred Method of Contact: Phone Call  Patient's Preferred Phone Number on File: [phone]   Best Call Back Number, if different:[phone]  Additional Information: Patient was seen at [facility] on 07/16, she fell and broke her arm. Dr Prakash treated her, she would like to get seen tomorrow if possible.

## (undated) DEVICE — SPONGE COTTON TRAY 4X4IN

## (undated) DEVICE — SYR ONLY LUER LOCK 20CC

## (undated) DEVICE — SOL NACL IRR 3000ML

## (undated) DEVICE — BLADE RESECT SHAVER F 3.5MM

## (undated) DEVICE — GLOVE 8 PROTEXIS PI BLUE

## (undated) DEVICE — GLOVE 7.0 PROTEXIS PI BLUE

## (undated) DEVICE — KIT EVACUATOR 3 SPR  DRN 400CC

## (undated) DEVICE — APPLICATOR CHLORAPREP ORN 26ML

## (undated) DEVICE — SOLIDIFIER BTL W/TREAT 1500CC

## (undated) DEVICE — SHAVER TOMCAT 4.0

## (undated) DEVICE — BLADE SHAVER ARTHSCP CUT 3.5MM

## (undated) DEVICE — TUBING CROSSFLOW INFLOW CASS

## (undated) DEVICE — BANDAGE ACE DOUBLE STER 6IN

## (undated) DEVICE — GLOVE BIOGEL SKINSENSE PI 8.0

## (undated) DEVICE — GLOVE BIOGEL SKINSENSE PI 7.0

## (undated) DEVICE — BAG RECTANGLE RBBRBND 30X36IN

## (undated) DEVICE — COVER PROXIMA MAYO STAND

## (undated) DEVICE — TOURNIQUET SB QC DP 30X4IN

## (undated) DEVICE — PACK KNEE ARTHROSCOPY  RUSH